# Patient Record
Sex: FEMALE | Race: OTHER | HISPANIC OR LATINO
[De-identification: names, ages, dates, MRNs, and addresses within clinical notes are randomized per-mention and may not be internally consistent; named-entity substitution may affect disease eponyms.]

---

## 2017-03-09 ENCOUNTER — TRANSCRIPTION ENCOUNTER (OUTPATIENT)
Age: 27
End: 2017-03-09

## 2017-05-30 ENCOUNTER — TRANSCRIPTION ENCOUNTER (OUTPATIENT)
Age: 27
End: 2017-05-30

## 2018-11-05 ENCOUNTER — TRANSCRIPTION ENCOUNTER (OUTPATIENT)
Age: 28
End: 2018-11-05

## 2019-07-05 ENCOUNTER — TRANSCRIPTION ENCOUNTER (OUTPATIENT)
Age: 29
End: 2019-07-05

## 2019-07-08 ENCOUNTER — APPOINTMENT (OUTPATIENT)
Dept: ORTHOPEDIC SURGERY | Facility: CLINIC | Age: 29
End: 2019-07-08
Payer: COMMERCIAL

## 2019-07-08 VITALS — BODY MASS INDEX: 40.73 KG/M2 | HEIGHT: 69 IN | WEIGHT: 275 LBS

## 2019-07-08 DIAGNOSIS — M75.52 BURSITIS OF LEFT SHOULDER: ICD-10-CM

## 2019-07-08 PROBLEM — Z00.00 ENCOUNTER FOR PREVENTIVE HEALTH EXAMINATION: Status: ACTIVE | Noted: 2019-07-08

## 2019-07-08 PROCEDURE — 73030 X-RAY EXAM OF SHOULDER: CPT | Mod: LT

## 2019-07-08 PROCEDURE — 99204 OFFICE O/P NEW MOD 45 MIN: CPT

## 2019-07-08 NOTE — DISCUSSION/SUMMARY
[de-identified] : This patient is improving with the Medrol Dosepak. I recommended observation. If in 2 weeks the pain has not gotten better with the Medrol then an MRI of the left shoulder would be done. At this time she may work. Followup will be as needed.

## 2019-07-08 NOTE — PHYSICAL EXAM
[de-identified] : Left shoulder shows a full range of motion some tenderness over the clavicle mild glenohumeral tenderness positive impingement sign no weakness neurovascular exam is normal. [de-identified] : Left shoulder/clavicle x-ray shows no acute fracture or dislocation

## 2019-07-08 NOTE — HISTORY OF PRESENT ILLNESS
[de-identified] : This patient is 2 weeks of left shoulder pain. There is no specific injury. She does lift heavier things at work. She believes it may have happened from that point. No numbness or tingling. She did go to the emergency room and was placed on a Medrol Dosepak and is markedly improved. This is since Friday. No prior problems.

## 2020-07-11 ENCOUNTER — TRANSCRIPTION ENCOUNTER (OUTPATIENT)
Age: 30
End: 2020-07-11

## 2021-10-24 ENCOUNTER — TRANSCRIPTION ENCOUNTER (OUTPATIENT)
Age: 31
End: 2021-10-24

## 2021-11-23 ENCOUNTER — APPOINTMENT (OUTPATIENT)
Dept: SURGERY | Facility: CLINIC | Age: 31
End: 2021-11-23
Payer: MEDICAID

## 2021-11-23 VITALS
BODY MASS INDEX: 43.4 KG/M2 | HEIGHT: 69 IN | WEIGHT: 293 LBS | SYSTOLIC BLOOD PRESSURE: 133 MMHG | OXYGEN SATURATION: 99 % | HEART RATE: 79 BPM | DIASTOLIC BLOOD PRESSURE: 79 MMHG

## 2021-11-23 VITALS — TEMPERATURE: 96.8 F

## 2021-11-23 DIAGNOSIS — K60.2 ANAL FISSURE, UNSPECIFIED: ICD-10-CM

## 2021-11-23 DIAGNOSIS — Z87.19 PERSONAL HISTORY OF OTHER DISEASES OF THE DIGESTIVE SYSTEM: ICD-10-CM

## 2021-11-23 DIAGNOSIS — Z82.3 FAMILY HISTORY OF STROKE: ICD-10-CM

## 2021-11-23 DIAGNOSIS — Z80.0 FAMILY HISTORY OF MALIGNANT NEOPLASM OF DIGESTIVE ORGANS: ICD-10-CM

## 2021-11-23 DIAGNOSIS — Z78.9 OTHER SPECIFIED HEALTH STATUS: ICD-10-CM

## 2021-11-23 DIAGNOSIS — Z82.49 FAMILY HISTORY OF ISCHEMIC HEART DISEASE AND OTHER DISEASES OF THE CIRCULATORY SYSTEM: ICD-10-CM

## 2021-11-23 DIAGNOSIS — Z83.3 FAMILY HISTORY OF DIABETES MELLITUS: ICD-10-CM

## 2021-11-23 PROCEDURE — 99203 OFFICE O/P NEW LOW 30 MIN: CPT

## 2021-11-23 RX ORDER — HYDROCORTISONE 25 MG/G
2.5 CREAM TOPICAL
Qty: 1 | Refills: 1 | Status: ACTIVE | COMMUNITY
Start: 2021-11-23 | End: 1900-01-01

## 2021-11-23 RX ORDER — LIDOCAINE 5 G/100G
5 OINTMENT TOPICAL
Qty: 60 | Refills: 1 | Status: ACTIVE | COMMUNITY
Start: 2021-11-23 | End: 1900-01-01

## 2021-12-02 PROBLEM — Z82.49 FAMILY HISTORY OF HYPERTENSION: Status: ACTIVE | Noted: 2021-12-02

## 2021-12-02 PROBLEM — Z82.49 FAMILY HISTORY OF MYOCARDIAL INFARCTION: Status: ACTIVE | Noted: 2021-12-02

## 2021-12-02 PROBLEM — Z82.49 FAMILY HISTORY OF CORONARY ARTERY DISEASE: Status: ACTIVE | Noted: 2021-12-02

## 2021-12-02 PROBLEM — Z78.9 NO TOBACCO SMOKE EXPOSURE: Status: ACTIVE | Noted: 2021-12-02

## 2021-12-02 PROBLEM — Z80.0 FH: PANCREATIC CANCER: Status: ACTIVE | Noted: 2021-12-02

## 2021-12-02 PROBLEM — Z82.3 FAMILY HISTORY OF CEREBROVASCULAR ACCIDENT (CVA): Status: ACTIVE | Noted: 2021-12-02

## 2021-12-02 PROBLEM — Z83.3 FAMILY HISTORY OF DIABETES MELLITUS: Status: ACTIVE | Noted: 2021-12-02

## 2021-12-02 PROBLEM — Z80.0 FAMILY HISTORY OF MALIGNANT NEOPLASM OF COLON: Status: ACTIVE | Noted: 2021-12-02

## 2021-12-02 PROBLEM — Z87.19 HISTORY OF GASTRITIS: Status: RESOLVED | Noted: 2021-12-02 | Resolved: 2021-12-02

## 2021-12-02 PROBLEM — Z78.9 CONSUMES ALCOHOL OCCASIONALLY: Status: ACTIVE | Noted: 2021-12-02

## 2021-12-02 NOTE — PHYSICAL EXAM
[Anterior] : anteriorly [Excoriation] : no perianal excoriation [JVD] : no jugular venous distention  [Normal Breath Sounds] : Normal breath sounds [Wheezing] : no wheezing was heard [Normal Heart Sounds] : normal heart sounds [Normal Rate and Rhythm] : normal rate and rhythm [Alert] : alert [Oriented to Person] : oriented to person [Oriented to Place] : oriented to place [Oriented to Time] : oriented to time [de-identified] : soft, obese, non-distended, non-tender to palpation [de-identified] : normal external skin and no external hemorrhoids, normal tone and no gross blood, pain on LISET (more so anterior), no anoscopy given pain on LISET [de-identified] : awake, alert, in NAD [de-identified] : normocephalic, atraumatic, EOMI, nl conjunctiva [de-identified] : b/l chest rise, EWOB on RA [de-identified] : deferred [de-identified] : normal strength [de-identified] : perianal skin as above [de-identified] : normal mood and affect

## 2021-12-02 NOTE — HISTORY OF PRESENT ILLNESS
[FreeTextEntry1] : Ms. Senait Roque is a 30y.o. F w/ gastritis presenting for rectal bleeding and pain. She reports that recently she has been having painful BMs and noticing blood w/ every BM. She follows with Dr. BUCKY Ocasio for her chronic gastritis and more recently in August 2021 had a colonoscopy for the bleeding at which time she was noted to just have some polyps. She currently has one BM a day (previously 2-3 times a day) that involve straining and the BMs start hard even though they are soft by the end of the BM. She tried colace for one month and has been on metamucil since July. She has tried prep H without any improvement and hydrocortisone.

## 2022-01-04 ENCOUNTER — APPOINTMENT (OUTPATIENT)
Dept: SURGERY | Facility: CLINIC | Age: 32
End: 2022-01-04

## 2022-02-10 ENCOUNTER — EMERGENCY (EMERGENCY)
Facility: HOSPITAL | Age: 32
LOS: 1 days | Discharge: HOME | End: 2022-02-10
Attending: EMERGENCY MEDICINE
Payer: COMMERCIAL

## 2022-02-10 VITALS
HEART RATE: 121 BPM | TEMPERATURE: 99 F | SYSTOLIC BLOOD PRESSURE: 121 MMHG | DIASTOLIC BLOOD PRESSURE: 79 MMHG | OXYGEN SATURATION: 98 % | RESPIRATION RATE: 18 BRPM

## 2022-02-10 LAB — HCG UR QL: NEGATIVE — SIGNIFICANT CHANGE UP

## 2022-02-10 PROCEDURE — 73030 X-RAY EXAM OF SHOULDER: CPT

## 2022-02-10 PROCEDURE — 99284 EMERGENCY DEPT VISIT MOD MDM: CPT | Mod: 25

## 2022-02-10 PROCEDURE — 73564 X-RAY EXAM KNEE 4 OR MORE: CPT | Mod: 26,LT

## 2022-02-10 PROCEDURE — 73564 X-RAY EXAM KNEE 4 OR MORE: CPT

## 2022-02-10 PROCEDURE — 73502 X-RAY EXAM HIP UNI 2-3 VIEWS: CPT

## 2022-02-10 PROCEDURE — 71046 X-RAY EXAM CHEST 2 VIEWS: CPT | Mod: 26

## 2022-02-10 PROCEDURE — 99053 MED SERV 10PM-8AM 24 HR FAC: CPT

## 2022-02-10 PROCEDURE — 93005 ELECTROCARDIOGRAM TRACING: CPT

## 2022-02-10 PROCEDURE — 73030 X-RAY EXAM OF SHOULDER: CPT | Mod: 26,LT

## 2022-02-10 PROCEDURE — 73502 X-RAY EXAM HIP UNI 2-3 VIEWS: CPT | Mod: 26,LT

## 2022-02-10 PROCEDURE — 71046 X-RAY EXAM CHEST 2 VIEWS: CPT

## 2022-02-10 PROCEDURE — 81025 URINE PREGNANCY TEST: CPT

## 2022-02-10 PROCEDURE — 93010 ELECTROCARDIOGRAM REPORT: CPT

## 2022-02-10 PROCEDURE — 99284 EMERGENCY DEPT VISIT MOD MDM: CPT

## 2022-02-10 RX ORDER — CYCLOBENZAPRINE HYDROCHLORIDE 10 MG/1
1 TABLET, FILM COATED ORAL
Qty: 9 | Refills: 0
Start: 2022-02-10 | End: 2022-02-12

## 2022-02-10 RX ORDER — IBUPROFEN 200 MG
600 TABLET ORAL ONCE
Refills: 0 | Status: COMPLETED | OUTPATIENT
Start: 2022-02-10 | End: 2022-02-10

## 2022-02-10 RX ORDER — OXYCODONE AND ACETAMINOPHEN 5; 325 MG/1; MG/1
1 TABLET ORAL ONCE
Refills: 0 | Status: DISCONTINUED | OUTPATIENT
Start: 2022-02-10 | End: 2022-02-10

## 2022-02-10 RX ORDER — OXYCODONE AND ACETAMINOPHEN 5; 325 MG/1; MG/1
1 TABLET ORAL
Qty: 12 | Refills: 0
Start: 2022-02-10 | End: 2022-02-12

## 2022-02-10 RX ORDER — IBUPROFEN 200 MG
1 TABLET ORAL
Qty: 21 | Refills: 0
Start: 2022-02-10 | End: 2022-02-16

## 2022-02-10 RX ADMIN — Medication 600 MILLIGRAM(S): at 04:00

## 2022-02-10 RX ADMIN — OXYCODONE AND ACETAMINOPHEN 1 TABLET(S): 5; 325 TABLET ORAL at 04:00
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
## 2022-02-10 NOTE — ED PROVIDER NOTE - NSFOLLOWUPINSTRUCTIONS_ED_ALL_ED_FT
[facility]                                                                                                                                                                                                                                                  Contusion      A contusion is a deep bruise. Contusions are the result of a blunt injury to tissues and muscle fibers under the skin. The injury causes bleeding under the skin. The skin overlying the contusion may turn blue, purple, or yellow. Minor injuries will give you a painless contusion, but more severe injuries cause contusions that may stay painful and swollen for a few weeks.      Follow these instructions at home:    Pay attention to any changes in your symptoms. Let your health care provider know about them. Take these actions to relieve your pain.      Managing pain, stiffness, and swelling    •Use resting, icing, applying pressure (compression), and raising (elevating) the injured area. This is often called the RICE strategy.  •Rest the injured area. Return to your normal activities as told by your health care provider. Ask your health care provider what activities are safe for you.    •If directed, put ice on the injured area:  •Put ice in a plastic bag.      •Place a towel between your skin and the bag.      •Leave the ice on for 20 minutes, 2–3 times per day.        •If directed, apply light compression to the injured area using an elastic bandage. Make sure the bandage is not wrapped too tightly. Remove and reapply the bandage as directed by your health care provider.      •If possible, raise (elevate) the injured area above the level of your heart while you are sitting or lying down.        General instructions     •Take over-the-counter and prescription medicines only as told by your health care provider.      •Keep all follow-up visits as told by your health care provider. This is important.        Contact a health care provider if:    •Your symptoms do not improve after several days of treatment.      •Your symptoms get worse.      •You have difficulty moving the injured area.        Get help right away if:    •You have severe pain.      •You have numbness in a hand or foot.      •Your hand or foot turns pale or cold.        Summary    •A contusion is a deep bruise.      •Contusions are the result of a blunt injury to tissues and muscle fibers under the skin.      •It is treated with rest, ice, compression, and elevation. You may be given over-the-counter medicines for pain.      •Contact a health care provider if your symptoms do not improve, or get worse.       •Get help right away if you have severe pain, have numbness, or the area turns pale or cold.      This information is not intended to replace advice given to you by your health care provider. Make sure you discuss any questions you have with your health care provider.      Document Revised: 08/08/2019 Document Reviewed: 08/08/2019    Elsevier Patient Education © 2021 [employer]
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
## 2022-02-10 NOTE — ED PROVIDER NOTE - PROGRESS NOTE DETAILS
xrays negative for fx. pain improving. ace bandage placed on knee. ambulatory with limp favoring the left LE. symptoms likely 2/2 contusion. will dc with pain meds. f/u with PMD. return precautions discussed.

## 2022-02-10 NOTE — ED PROVIDER NOTE - PHYSICAL EXAMINATION
left knee: swelling, ecchymoses, and ttp medial knee. decreased ROM 2/2 pain. remainder of knee WNL.  left shoulder: mild ttp medial aspect. full ROM. no skin changes.  mild ttp left lateral clavicle without swelling or skin changes.  no midline ttp neck or back.  b/l UE and LE N/V intact.

## 2022-02-10 NOTE — ED PROVIDER NOTE - PATIENT PORTAL LINK FT
You can access the FollowMyHealth Patient Portal offered by [facility] by registering at the following website: [email or web address]. By joining [employer]’s FollowMyHealth portal, you will also be able to view your health information using other applications (apps) compatible with our system.

## 2022-12-06 ENCOUNTER — NON-APPOINTMENT (OUTPATIENT)
Age: 32
End: 2022-12-06

## 2023-09-03 ENCOUNTER — EMERGENCY (EMERGENCY)
Facility: HOSPITAL | Age: 33
LOS: 1 days | Discharge: HOME | End: 2023-09-03
Attending: STUDENT IN AN ORGANIZED HEALTH CARE EDUCATION/TRAINING PROGRAM
Payer: SELF-PAY

## 2023-09-03 VITALS
OXYGEN SATURATION: 98 % | SYSTOLIC BLOOD PRESSURE: 118 MMHG | DIASTOLIC BLOOD PRESSURE: 77 MMHG | TEMPERATURE: 98 F | RESPIRATION RATE: 17 BRPM | HEART RATE: 96 BPM

## 2023-09-03 VITALS
HEIGHT: 69 IN | SYSTOLIC BLOOD PRESSURE: 141 MMHG | HEART RATE: 105 BPM | TEMPERATURE: 98 F | OXYGEN SATURATION: 98 % | RESPIRATION RATE: 18 BRPM | DIASTOLIC BLOOD PRESSURE: 85 MMHG | WEIGHT: 293 LBS

## 2023-09-03 LAB
ALBUMIN SERPL ELPH-MCNC: 3.8 G/DL — SIGNIFICANT CHANGE UP (ref 3.5–5)
ALP SERPL-CCNC: 107 U/L — SIGNIFICANT CHANGE UP (ref 40–120)
ALT FLD-CCNC: 30 U/L DA — SIGNIFICANT CHANGE UP (ref 10–60)
ANION GAP SERPL CALC-SCNC: 5 MMOL/L — SIGNIFICANT CHANGE UP (ref 5–17)
APTT BLD: 24.4 SEC — LOW (ref 24.5–35.6)
AST SERPL-CCNC: 16 U/L — SIGNIFICANT CHANGE UP (ref 10–40)
BASOPHILS # BLD AUTO: 0.04 K/UL — SIGNIFICANT CHANGE UP (ref 0–0.2)
BASOPHILS NFR BLD AUTO: 0.6 % — SIGNIFICANT CHANGE UP (ref 0–2)
BILIRUB SERPL-MCNC: 0.5 MG/DL — SIGNIFICANT CHANGE UP (ref 0.2–1.2)
BUN SERPL-MCNC: 11 MG/DL — SIGNIFICANT CHANGE UP (ref 7–18)
CALCIUM SERPL-MCNC: 9 MG/DL — SIGNIFICANT CHANGE UP (ref 8.4–10.5)
CHLORIDE SERPL-SCNC: 106 MMOL/L — SIGNIFICANT CHANGE UP (ref 96–108)
CO2 SERPL-SCNC: 26 MMOL/L — SIGNIFICANT CHANGE UP (ref 22–31)
CREAT SERPL-MCNC: 0.85 MG/DL — SIGNIFICANT CHANGE UP (ref 0.5–1.3)
D DIMER BLD IA.RAPID-MCNC: <150 NG/ML DDU — SIGNIFICANT CHANGE UP
EGFR: 94 ML/MIN/1.73M2 — SIGNIFICANT CHANGE UP
EOSINOPHIL # BLD AUTO: 0.11 K/UL — SIGNIFICANT CHANGE UP (ref 0–0.5)
EOSINOPHIL NFR BLD AUTO: 1.5 % — SIGNIFICANT CHANGE UP (ref 0–6)
GLUCOSE SERPL-MCNC: 126 MG/DL — HIGH (ref 70–99)
HCG SERPL-ACNC: <1 MIU/ML — SIGNIFICANT CHANGE UP
HCT VFR BLD CALC: 39.6 % — SIGNIFICANT CHANGE UP (ref 34.5–45)
HGB BLD-MCNC: 12.7 G/DL — SIGNIFICANT CHANGE UP (ref 11.5–15.5)
IMM GRANULOCYTES NFR BLD AUTO: 0.6 % — SIGNIFICANT CHANGE UP (ref 0–0.9)
INR BLD: 0.99 RATIO — SIGNIFICANT CHANGE UP (ref 0.85–1.18)
LYMPHOCYTES # BLD AUTO: 1.84 K/UL — SIGNIFICANT CHANGE UP (ref 1–3.3)
LYMPHOCYTES # BLD AUTO: 25.7 % — SIGNIFICANT CHANGE UP (ref 13–44)
MCHC RBC-ENTMCNC: 27.4 PG — SIGNIFICANT CHANGE UP (ref 27–34)
MCHC RBC-ENTMCNC: 32.1 GM/DL — SIGNIFICANT CHANGE UP (ref 32–36)
MCV RBC AUTO: 85.5 FL — SIGNIFICANT CHANGE UP (ref 80–100)
MONOCYTES # BLD AUTO: 0.55 K/UL — SIGNIFICANT CHANGE UP (ref 0–0.9)
MONOCYTES NFR BLD AUTO: 7.7 % — SIGNIFICANT CHANGE UP (ref 2–14)
NEUTROPHILS # BLD AUTO: 4.58 K/UL — SIGNIFICANT CHANGE UP (ref 1.8–7.4)
NEUTROPHILS NFR BLD AUTO: 63.9 % — SIGNIFICANT CHANGE UP (ref 43–77)
NRBC # BLD: 0 /100 WBCS — SIGNIFICANT CHANGE UP (ref 0–0)
PLATELET # BLD AUTO: 303 K/UL — SIGNIFICANT CHANGE UP (ref 150–400)
POTASSIUM SERPL-MCNC: 4.1 MMOL/L — SIGNIFICANT CHANGE UP (ref 3.5–5.3)
POTASSIUM SERPL-SCNC: 4.1 MMOL/L — SIGNIFICANT CHANGE UP (ref 3.5–5.3)
PROT SERPL-MCNC: 8.2 G/DL — SIGNIFICANT CHANGE UP (ref 6–8.3)
PROTHROM AB SERPL-ACNC: 11.3 SEC — SIGNIFICANT CHANGE UP (ref 9.5–13)
RBC # BLD: 4.63 M/UL — SIGNIFICANT CHANGE UP (ref 3.8–5.2)
RBC # FLD: 14.3 % — SIGNIFICANT CHANGE UP (ref 10.3–14.5)
SODIUM SERPL-SCNC: 137 MMOL/L — SIGNIFICANT CHANGE UP (ref 135–145)
TROPONIN I, HIGH SENSITIVITY RESULT: <3 NG/L — SIGNIFICANT CHANGE UP
TSH SERPL-MCNC: 5.1 UU/ML — HIGH (ref 0.34–4.82)
WBC # BLD: 7.16 K/UL — SIGNIFICANT CHANGE UP (ref 3.8–10.5)
WBC # FLD AUTO: 7.16 K/UL — SIGNIFICANT CHANGE UP (ref 3.8–10.5)

## 2023-09-03 PROCEDURE — 71275 CT ANGIOGRAPHY CHEST: CPT | Mod: MA

## 2023-09-03 PROCEDURE — 84480 ASSAY TRIIODOTHYRONINE (T3): CPT

## 2023-09-03 PROCEDURE — 71046 X-RAY EXAM CHEST 2 VIEWS: CPT | Mod: 26

## 2023-09-03 PROCEDURE — 99053 MED SERV 10PM-8AM 24 HR FAC: CPT

## 2023-09-03 PROCEDURE — 80053 COMPREHEN METABOLIC PANEL: CPT

## 2023-09-03 PROCEDURE — 70491 CT SOFT TISSUE NECK W/DYE: CPT | Mod: 26,MA

## 2023-09-03 PROCEDURE — 84443 ASSAY THYROID STIM HORMONE: CPT

## 2023-09-03 PROCEDURE — 71275 CT ANGIOGRAPHY CHEST: CPT | Mod: 26,MA

## 2023-09-03 PROCEDURE — 84439 ASSAY OF FREE THYROXINE: CPT

## 2023-09-03 PROCEDURE — 85379 FIBRIN DEGRADATION QUANT: CPT

## 2023-09-03 PROCEDURE — 96374 THER/PROPH/DIAG INJ IV PUSH: CPT | Mod: XU

## 2023-09-03 PROCEDURE — 85610 PROTHROMBIN TIME: CPT

## 2023-09-03 PROCEDURE — 84484 ASSAY OF TROPONIN QUANT: CPT

## 2023-09-03 PROCEDURE — 93010 ELECTROCARDIOGRAM REPORT: CPT

## 2023-09-03 PROCEDURE — 70491 CT SOFT TISSUE NECK W/DYE: CPT | Mod: MA

## 2023-09-03 PROCEDURE — 36415 COLL VENOUS BLD VENIPUNCTURE: CPT

## 2023-09-03 PROCEDURE — 85730 THROMBOPLASTIN TIME PARTIAL: CPT

## 2023-09-03 PROCEDURE — 93005 ELECTROCARDIOGRAM TRACING: CPT

## 2023-09-03 PROCEDURE — 84702 CHORIONIC GONADOTROPIN TEST: CPT

## 2023-09-03 PROCEDURE — 85025 COMPLETE CBC W/AUTO DIFF WBC: CPT

## 2023-09-03 PROCEDURE — 99285 EMERGENCY DEPT VISIT HI MDM: CPT | Mod: 25

## 2023-09-03 PROCEDURE — 71046 X-RAY EXAM CHEST 2 VIEWS: CPT

## 2023-09-03 PROCEDURE — 99285 EMERGENCY DEPT VISIT HI MDM: CPT

## 2023-09-03 RX ORDER — KETOROLAC TROMETHAMINE 30 MG/ML
15 SYRINGE (ML) INJECTION ONCE
Refills: 0 | Status: DISCONTINUED | OUTPATIENT
Start: 2023-09-03 | End: 2023-09-03

## 2023-09-03 RX ADMIN — Medication 15 MILLIGRAM(S): at 10:20

## 2023-09-03 RX ADMIN — Medication 15 MILLIGRAM(S): at 09:44

## 2023-09-03 NOTE — ED PROVIDER NOTE - CLINICAL SUMMARY MEDICAL DECISION MAKING FREE TEXT BOX
32-year-old female with no past medical history coming in with right-sided neck pain for past few days.  Having subjective fevers.  Feels short of breath with exertion.  No abdominal pain, nausea, vomiting, diarrhea, urinary complaints.  Denies chest pain.  No history of similar symptoms in the past, denies using birth control pills.  Denies recent surgeries.  No history of DVT or PE in the past.  No calf swelling or pain.  Patient is well-appearing.  No distress.  Clear to auscultation bilaterally.  Regular rate and rhythm.  Positive pain and swelling to the right side of the neck -anterior lateral aspect without overlying erythema or increased warmth.  No tonsillar swelling or exudates noted.   differential diagnoses include but not limited to abscess, viral illness, thyroid nodule.  Patient PERC negative–D-dimer is negative.

## 2023-09-03 NOTE — ED ADULT NURSE REASSESSMENT NOTE - NS ED NURSE REASSESS COMMENT FT1
Pt resting in bed, A&Ox3, awaiting CT scan, no acute distress noted, denies chest pain, no SOB noted.

## 2023-09-03 NOTE — ED ADULT NURSE NOTE - NSFALLUNIVINTERV_ED_ALL_ED
Bed/Stretcher in lowest position, wheels locked, appropriate side rails in place/Call bell, personal items and telephone in reach/Instruct patient to call for assistance before getting out of bed/chair/stretcher/Non-slip footwear applied when patient is off stretcher/[place] to call system/Physically safe environment - no spills, clutter or unnecessary equipment/Purposeful proactive rounding/Room/bathroom lighting operational, light cord in reach

## 2023-09-03 NOTE — ED PROVIDER NOTE - PROGRESS NOTE DETAILS
Rosita Rodriguez, DO:  Feels better. No new complaints. Results are discussed. An opportunity to ask questions was provided and all questions answered. Discharge plan is discussed with the patient. Patient reports understanding to do the follow up with pcp. Return precautions discussed.

## 2023-09-03 NOTE — ED PROVIDER NOTE - PATIENT PORTAL LINK FT
You can access the FollowMyHealth Patient Portal offered by [facility] by registering at the following website: [email or web address]. By joining [employer]s FollowMyHealth portal, you will also be able to view your health information using other applications (apps) compatible with our system.

## 2023-09-03 NOTE — ED PROVIDER NOTE - NSFOLLOWUPINSTRUCTIONS_ED_ALL_ED_FT
You were seen for neck pain that is likely due to lymph nodes that are sometimes swollen due to viral illness.     No signs of emergency medical condition on today's workup.  Your results are attached with your discharge instructions, please review them with your primary care physician. If there is a result pending, you will receive a call if test is positive.    A presumptive diagnosis is made today, but further evaluation may be required by your primary care doctor and/or specialist for a definitive diagnosis. Therefore, follow up as directed and if symptoms change/worsen or any emergency conditions, please return to the ER.    For pain or fever you can ibuprofen (motrin, advil) or tylenol as needed, as directed on packaging.  You can use 500-1000mg Tylenol every 6 hours for pain - as needed.  This is an over-the-counter medications - please respect the warnings on the label. This medication come with certain risks and side effects that you need to discuss with your doctor, especially if you are taking it for a prolonged period.    You can use 400-600mg Ibuprofen (such as motrin or advil) every 6 to 8 hours as needed for pain control.  Take ibuprofen with food or milk to lessen stomach upset.  This is an over-the-counter medication please respect the warnings on the label. All medications come with certain risks and side effects that you need to discuss with your doctor, especially if you are taking them for a prolonged period.    If needed, call patient access services at [phone] to find a primary care doctor, or call at [phone] to make an appointment at the clinic.

## 2023-09-03 NOTE — ED ADULT NURSE NOTE - NS ED NURSE DISCH DISPOSITION
[Time Spent: ___ minutes] : I have spent [unfilled] minutes of time on the encounter. [>50% of the face to face encounter time was spent on counseling and/or coordination of care for ___] : Greater than 50% of the face to face encounter time was spent on counseling and/or coordination of care for [unfilled] Discharged

## 2023-09-03 NOTE — ED ADULT NURSE NOTE - OBJECTIVE STATEMENT
As per patient c/o chest pain x2 days. Pt reports burning sensation on her arms. Patient reports SOB and dyspnea on exertion. Pt states she feels pressure on her chest. Pt also reports right sided facial pain that radiates down her right side of her body Patient denies any chest pain nausea, or vomiting. No apparent distress noted. Pt denies all other signs and symptoms

## 2023-09-09 LAB
T3 SERPL-MCNC: 131 NG/DL — SIGNIFICANT CHANGE UP
T3FREE SERPL-MCNC: 3.5 PG/ML — SIGNIFICANT CHANGE UP
T4 FREE SERPL-MCNC: 1.29 NG/DL — SIGNIFICANT CHANGE UP

## 2023-09-25 NOTE — ED ADULT NURSE NOTE - NSFALLRSKUNASSIST_ED_ALL_ED
[facility]    Brief Operative Note    Pre-operative diagnosis: Acute cholecystitis [K81.0]  Post-operative diagnosis Same as pre-operative diagnosis    Procedure: Procedure(s):  CHOLECYSTECTOMY, LAPAROSCOPIC  Surgeon: Surgeon(s) and Role:     * Mu Katz MD - Primary     * Tyrell Maldonado PA-C - Assisting  Anesthesia: General   Estimated Blood Loss: 8 mL from 9/25/2023  9:26 AM to 9/25/2023 10:52 AM      Drains: None  Specimens:   ID Type Source Tests Collected by Time Destination   1 :  Tissue Gallbladder SURGICAL PATHOLOGY EXAM Mu Katz MD 9/25/2023 10:29 AM      Findings:   Mildly inflamed gallbladder  Complications: None.  Implants: * No implants in log *    Inga Clark PA-STUDENT    Tyrell Maldonado PA-C  Office: [phone]  Pager: [phone]         no

## 2024-01-14 ENCOUNTER — INPATIENT (INPATIENT)
Facility: HOSPITAL | Age: 34
LOS: 2 days | Discharge: HOME | DRG: 552 | End: 2024-01-17
Attending: INTERNAL MEDICINE | Admitting: INTERNAL MEDICINE
Payer: COMMERCIAL

## 2024-01-14 VITALS
DIASTOLIC BLOOD PRESSURE: 78 MMHG | RESPIRATION RATE: 16 BRPM | TEMPERATURE: 98 F | HEIGHT: 69 IN | HEART RATE: 93 BPM | OXYGEN SATURATION: 100 % | WEIGHT: 293 LBS | SYSTOLIC BLOOD PRESSURE: 127 MMHG

## 2024-01-14 DIAGNOSIS — Z29.9 ENCOUNTER FOR PROPHYLACTIC MEASURES, UNSPECIFIED: ICD-10-CM

## 2024-01-14 DIAGNOSIS — M54.50 LOW BACK PAIN, UNSPECIFIED: ICD-10-CM

## 2024-01-14 DIAGNOSIS — M54.9 DORSALGIA, UNSPECIFIED: ICD-10-CM

## 2024-01-14 LAB
ANION GAP SERPL CALC-SCNC: 4 MMOL/L — LOW (ref 5–17)
ANION GAP SERPL CALC-SCNC: 4 MMOL/L — LOW (ref 5–17)
BUN SERPL-MCNC: 13 MG/DL — SIGNIFICANT CHANGE UP (ref 7–18)
BUN SERPL-MCNC: 13 MG/DL — SIGNIFICANT CHANGE UP (ref 7–18)
CALCIUM SERPL-MCNC: 9 MG/DL — SIGNIFICANT CHANGE UP (ref 8.4–10.5)
CALCIUM SERPL-MCNC: 9 MG/DL — SIGNIFICANT CHANGE UP (ref 8.4–10.5)
CHLORIDE SERPL-SCNC: 107 MMOL/L — SIGNIFICANT CHANGE UP (ref 96–108)
CHLORIDE SERPL-SCNC: 107 MMOL/L — SIGNIFICANT CHANGE UP (ref 96–108)
CO2 SERPL-SCNC: 26 MMOL/L — SIGNIFICANT CHANGE UP (ref 22–31)
CO2 SERPL-SCNC: 26 MMOL/L — SIGNIFICANT CHANGE UP (ref 22–31)
CREAT SERPL-MCNC: 0.65 MG/DL — SIGNIFICANT CHANGE UP (ref 0.5–1.3)
CREAT SERPL-MCNC: 0.65 MG/DL — SIGNIFICANT CHANGE UP (ref 0.5–1.3)
EGFR: 121 ML/MIN/1.73M2 — SIGNIFICANT CHANGE UP
EGFR: 121 ML/MIN/1.73M2 — SIGNIFICANT CHANGE UP
GLUCOSE SERPL-MCNC: 88 MG/DL — SIGNIFICANT CHANGE UP (ref 70–99)
GLUCOSE SERPL-MCNC: 88 MG/DL — SIGNIFICANT CHANGE UP (ref 70–99)
HCT VFR BLD CALC: 39.1 % — SIGNIFICANT CHANGE UP (ref 34.5–45)
HCT VFR BLD CALC: 39.1 % — SIGNIFICANT CHANGE UP (ref 34.5–45)
HGB BLD-MCNC: 12.5 G/DL — SIGNIFICANT CHANGE UP (ref 11.5–15.5)
HGB BLD-MCNC: 12.5 G/DL — SIGNIFICANT CHANGE UP (ref 11.5–15.5)
MCHC RBC-ENTMCNC: 27.2 PG — SIGNIFICANT CHANGE UP (ref 27–34)
MCHC RBC-ENTMCNC: 27.2 PG — SIGNIFICANT CHANGE UP (ref 27–34)
MCHC RBC-ENTMCNC: 32 GM/DL — SIGNIFICANT CHANGE UP (ref 32–36)
MCHC RBC-ENTMCNC: 32 GM/DL — SIGNIFICANT CHANGE UP (ref 32–36)
MCV RBC AUTO: 85 FL — SIGNIFICANT CHANGE UP (ref 80–100)
MCV RBC AUTO: 85 FL — SIGNIFICANT CHANGE UP (ref 80–100)
NRBC # BLD: 0 /100 WBCS — SIGNIFICANT CHANGE UP (ref 0–0)
NRBC # BLD: 0 /100 WBCS — SIGNIFICANT CHANGE UP (ref 0–0)
PLATELET # BLD AUTO: 304 K/UL — SIGNIFICANT CHANGE UP (ref 150–400)
PLATELET # BLD AUTO: 304 K/UL — SIGNIFICANT CHANGE UP (ref 150–400)
POTASSIUM SERPL-MCNC: 4.1 MMOL/L — SIGNIFICANT CHANGE UP (ref 3.5–5.3)
POTASSIUM SERPL-MCNC: 4.1 MMOL/L — SIGNIFICANT CHANGE UP (ref 3.5–5.3)
POTASSIUM SERPL-SCNC: 4.1 MMOL/L — SIGNIFICANT CHANGE UP (ref 3.5–5.3)
POTASSIUM SERPL-SCNC: 4.1 MMOL/L — SIGNIFICANT CHANGE UP (ref 3.5–5.3)
RBC # BLD: 4.6 M/UL — SIGNIFICANT CHANGE UP (ref 3.8–5.2)
RBC # BLD: 4.6 M/UL — SIGNIFICANT CHANGE UP (ref 3.8–5.2)
RBC # FLD: 13.9 % — SIGNIFICANT CHANGE UP (ref 10.3–14.5)
RBC # FLD: 13.9 % — SIGNIFICANT CHANGE UP (ref 10.3–14.5)
SODIUM SERPL-SCNC: 137 MMOL/L — SIGNIFICANT CHANGE UP (ref 135–145)
SODIUM SERPL-SCNC: 137 MMOL/L — SIGNIFICANT CHANGE UP (ref 135–145)
WBC # BLD: 9.05 K/UL — SIGNIFICANT CHANGE UP (ref 3.8–10.5)
WBC # BLD: 9.05 K/UL — SIGNIFICANT CHANGE UP (ref 3.8–10.5)
WBC # FLD AUTO: 9.05 K/UL — SIGNIFICANT CHANGE UP (ref 3.8–10.5)
WBC # FLD AUTO: 9.05 K/UL — SIGNIFICANT CHANGE UP (ref 3.8–10.5)

## 2024-01-14 PROCEDURE — 72110 X-RAY EXAM L-2 SPINE 4/>VWS: CPT | Mod: 26

## 2024-01-14 PROCEDURE — 99285 EMERGENCY DEPT VISIT HI MDM: CPT

## 2024-01-14 RX ORDER — SODIUM CHLORIDE 9 MG/ML
1000 INJECTION INTRAMUSCULAR; INTRAVENOUS; SUBCUTANEOUS ONCE
Refills: 0 | Status: COMPLETED | OUTPATIENT
Start: 2024-01-14 | End: 2024-01-14

## 2024-01-14 RX ORDER — GABAPENTIN 400 MG/1
1 CAPSULE ORAL
Qty: 15 | Refills: 0
Start: 2024-01-14 | End: 2024-01-18

## 2024-01-14 RX ORDER — IBUPROFEN 200 MG
1 TABLET ORAL
Qty: 42 | Refills: 0
Start: 2024-01-14 | End: 2024-01-27

## 2024-01-14 RX ORDER — ACETAMINOPHEN 500 MG
650 TABLET ORAL EVERY 6 HOURS
Refills: 0 | Status: DISCONTINUED | OUTPATIENT
Start: 2024-01-14 | End: 2024-01-15

## 2024-01-14 RX ORDER — GABAPENTIN 400 MG/1
300 CAPSULE ORAL ONCE
Refills: 0 | Status: COMPLETED | OUTPATIENT
Start: 2024-01-14 | End: 2024-01-14

## 2024-01-14 RX ORDER — IBUPROFEN 200 MG
600 TABLET ORAL ONCE
Refills: 0 | Status: COMPLETED | OUTPATIENT
Start: 2024-01-14 | End: 2024-01-14

## 2024-01-14 RX ORDER — ONDANSETRON 8 MG/1
8 TABLET, FILM COATED ORAL ONCE
Refills: 0 | Status: COMPLETED | OUTPATIENT
Start: 2024-01-14 | End: 2024-01-14

## 2024-01-14 RX ORDER — HYDROMORPHONE HYDROCHLORIDE 2 MG/ML
0.5 INJECTION INTRAMUSCULAR; INTRAVENOUS; SUBCUTANEOUS ONCE
Refills: 0 | Status: DISCONTINUED | OUTPATIENT
Start: 2024-01-14 | End: 2024-01-14

## 2024-01-14 RX ORDER — OXYCODONE AND ACETAMINOPHEN 5; 325 MG/1; MG/1
1 TABLET ORAL ONCE
Refills: 0 | Status: DISCONTINUED | OUTPATIENT
Start: 2024-01-14 | End: 2024-01-14

## 2024-01-14 RX ORDER — CYCLOBENZAPRINE HYDROCHLORIDE 10 MG/1
5 TABLET, FILM COATED ORAL THREE TIMES A DAY
Refills: 0 | Status: DISCONTINUED | OUTPATIENT
Start: 2024-01-14 | End: 2024-01-15

## 2024-01-14 RX ADMIN — GABAPENTIN 300 MILLIGRAM(S): 400 CAPSULE ORAL at 15:19

## 2024-01-14 RX ADMIN — SODIUM CHLORIDE 1000 MILLILITER(S): 9 INJECTION INTRAMUSCULAR; INTRAVENOUS; SUBCUTANEOUS at 15:19

## 2024-01-14 RX ADMIN — Medication 600 MILLIGRAM(S): at 15:26

## 2024-01-14 RX ADMIN — SODIUM CHLORIDE 1000 MILLILITER(S): 9 INJECTION INTRAMUSCULAR; INTRAVENOUS; SUBCUTANEOUS at 16:33

## 2024-01-14 RX ADMIN — HYDROMORPHONE HYDROCHLORIDE 0.5 MILLIGRAM(S): 2 INJECTION INTRAMUSCULAR; INTRAVENOUS; SUBCUTANEOUS at 20:05

## 2024-01-14 RX ADMIN — OXYCODONE AND ACETAMINOPHEN 1 TABLET(S): 5; 325 TABLET ORAL at 11:02

## 2024-01-14 RX ADMIN — ONDANSETRON 108 MILLIGRAM(S): 8 TABLET, FILM COATED ORAL at 16:33

## 2024-01-14 RX ADMIN — Medication 600 MILLIGRAM(S): at 11:02

## 2024-01-14 RX ADMIN — HYDROMORPHONE HYDROCHLORIDE 0.5 MILLIGRAM(S): 2 INJECTION INTRAMUSCULAR; INTRAVENOUS; SUBCUTANEOUS at 15:26

## 2024-01-14 RX ADMIN — ONDANSETRON 8 MILLIGRAM(S): 8 TABLET, FILM COATED ORAL at 20:05

## 2024-01-14 RX ADMIN — OXYCODONE AND ACETAMINOPHEN 1 TABLET(S): 5; 325 TABLET ORAL at 15:26

## 2024-01-14 RX ADMIN — SODIUM CHLORIDE 1000 MILLILITER(S): 9 INJECTION INTRAMUSCULAR; INTRAVENOUS; SUBCUTANEOUS at 20:05

## 2024-01-14 NOTE — ED ADULT NURSE NOTE - TEMPLATE
Back Pain patient remained hemodynamically stable, results of the labs, imaging findings reviewed and discussed with patient, discussed with admitting physician and MAR, patient is admitted to Medicine for further evaluation and care.

## 2024-01-14 NOTE — H&P ADULT - PROBLEM SELECTOR PLAN 1
Pw lower back pain, radiating to lower ext b/l  PE - pain upon hip flexion, no pain on straight leg raise, no signs of compression    started on toradol 30mg prn   started on flexiril   Neurology consulted   f/u lumbarsacral x rays

## 2024-01-14 NOTE — H&P ADULT - HISTORY OF PRESENT ILLNESS
33 y.o. F PMHx of morbid obesity presents to the ED today with lower back pain.  Patient reports yesterday she suddenly had sharp pain and stiffness in her lower back rating down both legs.  Patient states that she has no comfortable position when she sitting or standing she has pain.  Denies any numbness, tingling, sensory losses, incontinence or inability to move lower extremities. States she did not take anything for the pain. Otherwise pt denies any chest pain, palpitations, shortness of breath, fever, chills, headache, visual changes, nausea, vomiting diarrhea. NKDA

## 2024-01-14 NOTE — H&P ADULT - NSHPREVIEWOFSYSTEMS_GEN_ALL_CORE
REVIEW OF SYSTEMS:    CONSTITUTIONAL: No weakness, fevers or chills  EYES/ENT: No visual changes;  No vertigo or throat pain   NECK: No pain or stiffness  RESPIRATORY: No cough, wheezing, hemoptysis; No shortness of breath  CARDIOVASCULAR: No chest pain or palpitations  GASTROINTESTINAL: No abdominal or epigastric pain. No nausea, vomiting, or hematemesis; No diarrhea or constipation. No melena or hematochezia.  GENITOURINARY: No dysuria, frequency or hematuria  NEUROLOGICAL: No numbness or weakness  SKIN: No itching, burning, rashes, or lesions   MSK: + lower back pain   All other review of systems is negative unless indicated above.

## 2024-01-14 NOTE — H&P ADULT - ASSESSMENT
33 y.o. F PMHx of morbid obesity presents to the ED today with lower back pain. Admitted to medicine for pain management, pending neurology consult.

## 2024-01-14 NOTE — ED PROVIDER NOTE - PROGRESS NOTE DETAILS
Patient has no red flags for back pain.  States she has severe pain and would like to obtain imaging.  Explained to patient the benefits and risks of imaging and atraumatic back pain patient insistent that she would like imaging of her spine and CT lumbar pending. Pt received as a signout.  Pending CT.  Patient is evaluated.  Patient does not have any spinal tenderness to palpation however also never had any imaging done to the back.  Shared decision making to hold off on the CT and perform an x-ray instead.  X-rays reviewed and no fractures noted.  Patient continues to have pain–improved but still having difficulty getting out of bed.  Patient is offered muscle relaxant.  Patient reports she has been very nauseous from all the medications she has gotten and would like to hold off at this time.  Equal strength and sensation in lower extremities no urinary incontinence, stool incontinence, urinary retention, fevers.  Case is discussed with Dr. Starks and MAR agrees with admission.

## 2024-01-14 NOTE — ED PROVIDER NOTE - OBJECTIVE STATEMENT
33-year-old female past medical history of morbid obesity presents for lower back pain.  Patient reports yesterday she suddenly had sharp pain and stiffness in her lower back rating down both legs.  Patient states that she has no comfortable position when she sitting or standing she has pain.  Denies incontinence or inability to move lower extremities.    GENERAL APPEARANCE:  AxOx4, generally well-appearing, morbidly obese, no acute distress.  HEENT:  NC, AT. MMM. EOMI, clear conjunctiva, oropharynx clear.  NECK:  Supple without lymphadenopathy.  No stiffness or restricted ROM.  HEART:  Normal rate and regular rhythm, normal S1/S1, no m/r/g  LUNGS:  CTAB, moving air well. No crackles or wheezes are heard.  ABDOMEN:  Soft, nontender, nondistended with good bowel sounds heard.  BACK: No CVAT, no obvious deformity.  EXTREMITIES:  Without cyanosis, clubbing or edema. (-) straight leg raise.  NEUROLOGICAL:  Grossly nonfocal. Alert and oriented, moving all 4 extremities. CN II-XII grossly intact. Observed to ambulate with normal gait.  Skin:  Warm and dry without any rash.

## 2024-01-14 NOTE — H&P ADULT - ATTENDING COMMENTS
Pt appearing with debilitating LBP, clinically indicative of acute radiculopathy incapacitating pt in daily tasks /ambulation. Pain management initiated a regimen. May benefit from starting flexeril. Neurology to follow in a.m. May consider outpt epidural should this not improve in time.

## 2024-01-14 NOTE — H&P ADULT - NSHPPHYSICALEXAM_GEN_ALL_CORE
VITALS:     GENERAL: NAD, lying in bed comfortably  HEAD:  Atraumatic, Normocephalic  EYES: EOMI, PERRLA, conjunctiva and sclera clear  ENT: Moist mucous membranes  NECK: Supple, No JVD  CHEST/LUNG: Clear to auscultation bilaterally; No rales, rhonchi, wheezing, or rubs. Unlabored respirations  HEART: Regular rate and rhythm; No murmurs, rubs, or gallops  ABDOMEN: Bowel sounds present; Soft, Nontender, Nondistended. No hepatomegaly  EXTREMITIES:  2+ Peripheral Pulses, brisk capillary refill. No clubbing, cyanosis, or edema  NERVOUS SYSTEM:  Alert & Oriented X3, speech clear. No deficits   MSK: NEG straight leg raise test, FROM all 4 extremities, full and equal strength  SKIN: No rashes or lesions

## 2024-01-14 NOTE — ED ADULT TRIAGE NOTE - CHIEF COMPLAINT QUOTE
lower back pain radiating to both legs and both hands tingling since yesterday after moving heavy stuffs.

## 2024-01-14 NOTE — ED PROVIDER NOTE - CLINICAL SUMMARY MEDICAL DECISION MAKING FREE TEXT BOX
This patient presents with back pain most consistent with _. Differential diagnoses includes lumbago versus musculoskeletal spasm / strain versus sciatica. Less likely sciatica as straight leg raise test was negative. No back pain red flags on history or physical. Presentation not consistent with malignancy (lack of history of malignancy, lack of B symptoms), fracture (no trauma, no bony tenderness to palpation), cauda equina (no bowel or urinary incontinence/retention, no saddle anesthesia, no distal weakness), AAA, viscus perforation, osteomyelitis or epidural abscess (no IVDU, vertebral tenderness), renal colic, pyelonephritis (afebrile, no CVAT, no urinary symptoms). Given the clinical picture, no indication for imaging at this time.

## 2024-01-14 NOTE — H&P ADULT - PROBLEM SELECTOR PLAN 2
IMPROVE VTE Individual Risk Assessment          RISK                                                          Points  [  ] Previous VTE                                                3  [  ] Thrombophilia                                             2  [  ] Lower limb paralysis                                   2        (unable to hold up >15 seconds)    [  ] Current Cancer                                             2         (within 6 months)  [ x ] Immobilization > 24 hrs                              1  [  ] ICU/CCU stay > 24 hours                             1  [  ] Age > 60                                                         1    IMPROVE VTE Score:         [   1     ]

## 2024-01-14 NOTE — ED ADULT NURSE NOTE - NSFALLUNIVINTERV_ED_ALL_ED
Bed/Stretcher in lowest position, wheels locked, appropriate side rails in place/Call bell, personal items and telephone in reach/Instruct patient to call for assistance before getting out of bed/chair/stretcher/Non-slip footwear applied when patient is off stretcher/[place] to call system/Physically safe environment - no spills, clutter or unnecessary equipment/Purposeful proactive rounding/Room/bathroom lighting operational, light cord in reach Bed/Stretcher in lowest position, wheels locked, appropriate side rails in place/Call bell, personal items and telephone in reach/Instruct patient to call for assistance before getting out of bed/chair/stretcher/Non-slip footwear applied when patient is off stretcher/[place] to call system/Physically safe environment - no spills, clutter or unnecessary equipment/Purposeful proactive rounding/Room/bathroom lighting operational, light cord in reach Bed/Stretcher in lowest position, wheels locked, appropriate side rails in place/Call bell, personal items and telephone in reach/Instruct patient to call for assistance before getting out of bed/chair/stretcher/Non-slip footwear applied when patient is off stretcher/[place] to call system/Physically safe environment - no spills, clutter or unnecessary equipment/Purposeful proactive rounding/Room/bathroom lighting operational, light cord in reach

## 2024-01-15 DIAGNOSIS — M51.36 OTHER INTERVERTEBRAL DISC DEGENERATION, LUMBAR REGION: ICD-10-CM

## 2024-01-15 DIAGNOSIS — M54.9 DORSALGIA, UNSPECIFIED: ICD-10-CM

## 2024-01-15 DIAGNOSIS — E66.01 MORBID (SEVERE) OBESITY DUE TO EXCESS CALORIES: ICD-10-CM

## 2024-01-15 DIAGNOSIS — K29.70 GASTRITIS, UNSPECIFIED, WITHOUT BLEEDING: ICD-10-CM

## 2024-01-15 LAB
ANION GAP SERPL CALC-SCNC: 3 MMOL/L — LOW (ref 5–17)
ANION GAP SERPL CALC-SCNC: 3 MMOL/L — LOW (ref 5–17)
BUN SERPL-MCNC: 15 MG/DL — SIGNIFICANT CHANGE UP (ref 7–18)
BUN SERPL-MCNC: 15 MG/DL — SIGNIFICANT CHANGE UP (ref 7–18)
CALCIUM SERPL-MCNC: 9.2 MG/DL — SIGNIFICANT CHANGE UP (ref 8.4–10.5)
CALCIUM SERPL-MCNC: 9.2 MG/DL — SIGNIFICANT CHANGE UP (ref 8.4–10.5)
CHLORIDE SERPL-SCNC: 111 MMOL/L — HIGH (ref 96–108)
CHLORIDE SERPL-SCNC: 111 MMOL/L — HIGH (ref 96–108)
CO2 SERPL-SCNC: 26 MMOL/L — SIGNIFICANT CHANGE UP (ref 22–31)
CO2 SERPL-SCNC: 26 MMOL/L — SIGNIFICANT CHANGE UP (ref 22–31)
CREAT SERPL-MCNC: 0.86 MG/DL — SIGNIFICANT CHANGE UP (ref 0.5–1.3)
CREAT SERPL-MCNC: 0.86 MG/DL — SIGNIFICANT CHANGE UP (ref 0.5–1.3)
EGFR: 93 ML/MIN/1.73M2 — SIGNIFICANT CHANGE UP
EGFR: 93 ML/MIN/1.73M2 — SIGNIFICANT CHANGE UP
GLUCOSE SERPL-MCNC: 94 MG/DL — SIGNIFICANT CHANGE UP (ref 70–99)
GLUCOSE SERPL-MCNC: 94 MG/DL — SIGNIFICANT CHANGE UP (ref 70–99)
HCT VFR BLD CALC: 35.4 % — SIGNIFICANT CHANGE UP (ref 34.5–45)
HCT VFR BLD CALC: 35.4 % — SIGNIFICANT CHANGE UP (ref 34.5–45)
HGB BLD-MCNC: 11.2 G/DL — LOW (ref 11.5–15.5)
HGB BLD-MCNC: 11.2 G/DL — LOW (ref 11.5–15.5)
MCHC RBC-ENTMCNC: 27.3 PG — SIGNIFICANT CHANGE UP (ref 27–34)
MCHC RBC-ENTMCNC: 27.3 PG — SIGNIFICANT CHANGE UP (ref 27–34)
MCHC RBC-ENTMCNC: 31.6 GM/DL — LOW (ref 32–36)
MCHC RBC-ENTMCNC: 31.6 GM/DL — LOW (ref 32–36)
MCV RBC AUTO: 86.1 FL — SIGNIFICANT CHANGE UP (ref 80–100)
MCV RBC AUTO: 86.1 FL — SIGNIFICANT CHANGE UP (ref 80–100)
NRBC # BLD: 0 /100 WBCS — SIGNIFICANT CHANGE UP (ref 0–0)
NRBC # BLD: 0 /100 WBCS — SIGNIFICANT CHANGE UP (ref 0–0)
PLATELET # BLD AUTO: 275 K/UL — SIGNIFICANT CHANGE UP (ref 150–400)
PLATELET # BLD AUTO: 275 K/UL — SIGNIFICANT CHANGE UP (ref 150–400)
POTASSIUM SERPL-MCNC: 4.3 MMOL/L — SIGNIFICANT CHANGE UP (ref 3.5–5.3)
POTASSIUM SERPL-MCNC: 4.3 MMOL/L — SIGNIFICANT CHANGE UP (ref 3.5–5.3)
POTASSIUM SERPL-SCNC: 4.3 MMOL/L — SIGNIFICANT CHANGE UP (ref 3.5–5.3)
POTASSIUM SERPL-SCNC: 4.3 MMOL/L — SIGNIFICANT CHANGE UP (ref 3.5–5.3)
RBC # BLD: 4.11 M/UL — SIGNIFICANT CHANGE UP (ref 3.8–5.2)
RBC # BLD: 4.11 M/UL — SIGNIFICANT CHANGE UP (ref 3.8–5.2)
RBC # FLD: 14 % — SIGNIFICANT CHANGE UP (ref 10.3–14.5)
RBC # FLD: 14 % — SIGNIFICANT CHANGE UP (ref 10.3–14.5)
SODIUM SERPL-SCNC: 140 MMOL/L — SIGNIFICANT CHANGE UP (ref 135–145)
SODIUM SERPL-SCNC: 140 MMOL/L — SIGNIFICANT CHANGE UP (ref 135–145)
WBC # BLD: 7.41 K/UL — SIGNIFICANT CHANGE UP (ref 3.8–10.5)
WBC # BLD: 7.41 K/UL — SIGNIFICANT CHANGE UP (ref 3.8–10.5)
WBC # FLD AUTO: 7.41 K/UL — SIGNIFICANT CHANGE UP (ref 3.8–10.5)
WBC # FLD AUTO: 7.41 K/UL — SIGNIFICANT CHANGE UP (ref 3.8–10.5)

## 2024-01-15 PROCEDURE — 99222 1ST HOSP IP/OBS MODERATE 55: CPT

## 2024-01-15 RX ORDER — GABAPENTIN 400 MG/1
300 CAPSULE ORAL THREE TIMES A DAY
Refills: 0 | Status: DISCONTINUED | OUTPATIENT
Start: 2024-01-15 | End: 2024-01-17

## 2024-01-15 RX ORDER — ACETAMINOPHEN 500 MG
1000 TABLET ORAL EVERY 6 HOURS
Refills: 0 | Status: DISCONTINUED | OUTPATIENT
Start: 2024-01-15 | End: 2024-01-17

## 2024-01-15 RX ORDER — ENOXAPARIN SODIUM 100 MG/ML
40 INJECTION SUBCUTANEOUS EVERY 12 HOURS
Refills: 0 | Status: DISCONTINUED | OUTPATIENT
Start: 2024-01-15 | End: 2024-01-17

## 2024-01-15 RX ORDER — SENNA PLUS 8.6 MG/1
2 TABLET ORAL AT BEDTIME
Refills: 0 | Status: DISCONTINUED | OUTPATIENT
Start: 2024-01-15 | End: 2024-01-17

## 2024-01-15 RX ORDER — OXYCODONE HYDROCHLORIDE 5 MG/1
5 TABLET ORAL EVERY 4 HOURS
Refills: 0 | Status: DISCONTINUED | OUTPATIENT
Start: 2024-01-15 | End: 2024-01-17

## 2024-01-15 RX ORDER — KETOROLAC TROMETHAMINE 30 MG/ML
30 SYRINGE (ML) INJECTION EVERY 6 HOURS
Refills: 0 | Status: DISCONTINUED | OUTPATIENT
Start: 2024-01-15 | End: 2024-01-15

## 2024-01-15 RX ORDER — ONDANSETRON 8 MG/1
4 TABLET, FILM COATED ORAL EVERY 8 HOURS
Refills: 0 | Status: DISCONTINUED | OUTPATIENT
Start: 2024-01-15 | End: 2024-01-17

## 2024-01-15 RX ORDER — CYCLOBENZAPRINE HYDROCHLORIDE 10 MG/1
10 TABLET, FILM COATED ORAL THREE TIMES A DAY
Refills: 0 | Status: DISCONTINUED | OUTPATIENT
Start: 2024-01-15 | End: 2024-01-17

## 2024-01-15 RX ORDER — LIDOCAINE 4 G/100G
1 CREAM TOPICAL DAILY
Refills: 0 | Status: DISCONTINUED | OUTPATIENT
Start: 2024-01-15 | End: 2024-01-17

## 2024-01-15 RX ORDER — ENOXAPARIN SODIUM 100 MG/ML
150 INJECTION SUBCUTANEOUS EVERY 24 HOURS
Refills: 0 | Status: DISCONTINUED | OUTPATIENT
Start: 2024-01-15 | End: 2024-01-15

## 2024-01-15 RX ORDER — OXYCODONE HYDROCHLORIDE 5 MG/1
5 TABLET ORAL ONCE
Refills: 0 | Status: DISCONTINUED | OUTPATIENT
Start: 2024-01-15 | End: 2024-01-15

## 2024-01-15 RX ORDER — PANTOPRAZOLE SODIUM 20 MG/1
40 TABLET, DELAYED RELEASE ORAL
Refills: 0 | Status: DISCONTINUED | OUTPATIENT
Start: 2024-01-15 | End: 2024-01-17

## 2024-01-15 RX ORDER — POLYETHYLENE GLYCOL 3350 17 G/17G
17 POWDER, FOR SOLUTION ORAL DAILY
Refills: 0 | Status: DISCONTINUED | OUTPATIENT
Start: 2024-01-15 | End: 2024-01-17

## 2024-01-15 RX ORDER — KETOROLAC TROMETHAMINE 30 MG/ML
30 SYRINGE (ML) INJECTION EVERY 6 HOURS
Refills: 0 | Status: DISCONTINUED | OUTPATIENT
Start: 2024-01-15 | End: 2024-01-16

## 2024-01-15 RX ADMIN — Medication 30 MILLIGRAM(S): at 13:15

## 2024-01-15 RX ADMIN — CYCLOBENZAPRINE HYDROCHLORIDE 10 MILLIGRAM(S): 10 TABLET, FILM COATED ORAL at 13:14

## 2024-01-15 RX ADMIN — ENOXAPARIN SODIUM 40 MILLIGRAM(S): 100 INJECTION SUBCUTANEOUS at 18:33

## 2024-01-15 RX ADMIN — Medication 30 MILLIGRAM(S): at 19:33

## 2024-01-15 RX ADMIN — Medication 30 MILLIGRAM(S): at 14:15

## 2024-01-15 RX ADMIN — CYCLOBENZAPRINE HYDROCHLORIDE 10 MILLIGRAM(S): 10 TABLET, FILM COATED ORAL at 21:59

## 2024-01-15 RX ADMIN — Medication 1000 MILLIGRAM(S): at 18:33

## 2024-01-15 RX ADMIN — OXYCODONE HYDROCHLORIDE 5 MILLIGRAM(S): 5 TABLET ORAL at 03:20

## 2024-01-15 RX ADMIN — GABAPENTIN 300 MILLIGRAM(S): 400 CAPSULE ORAL at 13:14

## 2024-01-15 RX ADMIN — Medication 650 MILLIGRAM(S): at 01:44

## 2024-01-15 RX ADMIN — Medication 1000 MILLIGRAM(S): at 13:13

## 2024-01-15 RX ADMIN — OXYCODONE HYDROCHLORIDE 5 MILLIGRAM(S): 5 TABLET ORAL at 04:30

## 2024-01-15 RX ADMIN — Medication 1000 MILLIGRAM(S): at 14:13

## 2024-01-15 RX ADMIN — Medication 650 MILLIGRAM(S): at 00:44

## 2024-01-15 RX ADMIN — SENNA PLUS 2 TABLET(S): 8.6 TABLET ORAL at 21:59

## 2024-01-15 RX ADMIN — LIDOCAINE 1 PATCH: 4 CREAM TOPICAL at 13:14

## 2024-01-15 RX ADMIN — Medication 30 MILLIGRAM(S): at 18:33

## 2024-01-15 RX ADMIN — ONDANSETRON 4 MILLIGRAM(S): 8 TABLET, FILM COATED ORAL at 21:59

## 2024-01-15 RX ADMIN — GABAPENTIN 300 MILLIGRAM(S): 400 CAPSULE ORAL at 21:59

## 2024-01-15 RX ADMIN — Medication 1000 MILLIGRAM(S): at 19:33

## 2024-01-15 RX ADMIN — LIDOCAINE 1 PATCH: 4 CREAM TOPICAL at 19:20

## 2024-01-15 RX ADMIN — POLYETHYLENE GLYCOL 3350 17 GRAM(S): 17 POWDER, FOR SOLUTION ORAL at 13:13

## 2024-01-15 RX ADMIN — CYCLOBENZAPRINE HYDROCHLORIDE 5 MILLIGRAM(S): 10 TABLET, FILM COATED ORAL at 00:43

## 2024-01-15 NOTE — CONSULT NOTE ADULT - ASSESSMENT
It doesn't appear that she has a limited lumbosacral disc disease alone. 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 Recommend investigation of demyelinating spine and optic nerve disorder ( Multiple Sclerosis or Neuromyelitis Optica/Opticospinal demyelinating disease). Recommend MRI C spine and T spine w/wo, MRI lumbar spine wo and MRI head w/wo, including sagittal T2 FLAIR views and o attention bilateral optic nerves. Check blood Neuromyelitis Optica antibody level and Myelin Associated Glycoprotein antibody.

## 2024-01-15 NOTE — CONSULT NOTE ADULT - SUBJECTIVE AND OBJECTIVE BOX
Patient is a 33y old  Female who presents with a chief complaint of     HPI:  Back pain that developed on Saturday. Moderately severe radiating into both thighs up to the knees only. The next day - Sunday 14th she found she could not stand and she had numbness in both fingers as well as a pain in the left side of the back radiating to the front of the abdomen above the umbilicus. She admits problems with eyesight that have not been corrected by refraction and she is scheduled to visit an ophthalmologist again. Vision problems may have begun a year ago. She denies bladder or bowel problems  PAST MEDICAL & SURGICAL HISTORY:      FAMILY HISTORY:        Social Hx:  Nonsmoker, no drug or alcohol use    MEDICATIONS  (STANDING):  acetaminophen     Tablet .. 1000 milliGRAM(s) Oral every 6 hours  cyclobenzaprine 10 milliGRAM(s) Oral three times a day  gabapentin 300 milliGRAM(s) Oral three times a day  ketorolac   Injectable 30 milliGRAM(s) IV Push every 6 hours  lidocaine   4% Patch 1 Patch Transdermal daily  pantoprazole    Tablet 40 milliGRAM(s) Oral before breakfast  polyethylene glycol 3350 17 Gram(s) Oral daily  senna 2 Tablet(s) Oral at bedtime       Allergies    cefazolin (Hives)    Intolerances        ROS: Pertinent positives in HPI, all other ROS were reviewed and are negative.      Vital Signs Last 24 Hrs  T(C): 36.7 (15 Gonzalo 2024 05:01), Max: 37 (14 Jan 2024 19:32)  T(F): 98.1 (15 Gonzalo 2024 05:01), Max: 98.6 (14 Jan 2024 19:32)  HR: 85 (15 Gonzalo 2024 05:01) (73 - 94)  BP: 118/65 (15 Gonzalo 2024 05:01) (112/72 - 131/86)  BP(mean): --  RR: 15 (15 Gonzalo 2024 05:01) (15 - 20)  SpO2: 98% (15 Gonzalo 2024 05:01) (97% - 100%)    Parameters below as of 15 Gonzalo 2024 05:01  Patient On (Oxygen Delivery Method): room air            Constitutional: awake and alert.  HEENT: PERRLA, EOMI,   Neck: Supple.  Respiratory: Breath sounds are clear bilaterally  Cardiovascular: S1 and S2, regular / irregular rhythm  Gastrointestinal: soft, nontender  Extremities:  no edema  Vascular: Caritid Bruit - no  Musculoskeletal: no joint swelling/tenderness, no abnormal movements  Skin: No rashes    Neurological exam:  HF: A x O x 3. Appropriately interactive, normal affect. Speech fluent, No Aphasia or paraphasic errors. Naming /repetition intact   CN: APD OD diminished color vision OD, VA 20/40 OD and 20/30 OS without correction, EOMI, VFF, facial sensation normal, no NLFD, tongue midline, Palate moves equally, SCM equal bilaterally  Motor: No pronator drift, Strength 5/5 in all 4 ext except hip adductors and abductors, normal bulk and tone, no tremor, rigidity or bradykinesia.    Sens: Diminished light touch, pin and temperature below T5 level bilaterally   Reflexes: Symmetric and normal . BJ 2+, BR 2+, KJ 2+, AJ 2+, downgoing toes b/l  Coord:  No FNFA, dysmetria, GENOVEVA intact   Gait/Balance: Cannot test    NIHSS:          Labs:                        11.2   7.41  )-----------( 275      ( 15 Gonzalo 2024 05:07 )             35.4     01-15    140  |  111<H>  |  15  ----------------------------<  94  4.3   |  26  |  0.86    Ca    9.2      15 Gonzalo 2024 05:07              Radiology report:  < from: CT Angio Chest PE Protocol w/ IV Cont (09.03.23 @ 08:23) >  ACC: [id number] [facility]   ORDERED BY:   CHARISSE BE     PROCEDURE DATE:  09/03/2023          INTERPRETATION:  CLINICAL INFORMATION: Evaluate for pulmonary embolus.    COMPARISON: None.    CONTRAST/COMPLICATIONS:  IV Contrast: Omnipaque 350  50 cc administered   0 cc discarded  Oral Contrast: NONE  Complications: None reported at time of study completion    PROCEDURE:  CT Angiography of the Chest.  Sagittal and coronal reformats were performed as well as 3D (MIP)   reconstructions.    FINDINGS:    LUNGS AND AIRWAYS: Central airways are patent. No large focal   consolidation.  PLEURA: No pleural effusion or pneumothorax  MEDIASTINUM AND REA: No enlarged lymph nodes of the thorax  VESSELS: No pulmonary embolus upto segmental level. Subsegmental   pulmonary artery branches are incompletely evaluated due to poor   opacification. Main pulmonary artery is enlarged measuring 3.4 cm. This   may reflect pulmonary arterial hypertension. Normal caliber of the   thoracic aorta.  HEART: Heart size is qualitatively within normal limits. Trace   pericardial fluid.  CHEST WALL AND LOWER NECK: Findings of the neck are reviewed and reported   on separately. Please refer to separate report. No chest wall hematoma.   Partially imaged left breast with 12 mm nodular density on image 79   series 7, of unclear etiology. Recommend mammogram/ultrasound at a   dedicated breast imaging center.  VISUALIZED UPPER ABDOMEN: Subcentimeter hepatic hypodensity is too small   to characterize. Small volume nodes of the partially imaged upper abdomen   under 1 cm in short axis.  BONES: No aggressive osseous lesion.    IMPRESSION:    No pulmonary embolus up to segmental level. Subsegmental pulmonary artery   branches are incompletely evaluated due to poor opacification. Enlarged   main pulmonary artery may reflect pulmonary arterial hypertension.    Partially imaged left breast with 12 mm nodular density on image 79   series 7, of unclear etiology. Recommend mammogram/ultrasoundat a   dedicated breast imaging center.    Findings of the neck are reviewed and reported on separately. Please   refer to separate report.    --- End of Report ---            EDGAR ARTIS M.D., ATTENDING RADIOLOGIST  This document has been electronically signed. Sep  3 2023  8:32AM    < end of copied text >    < from: CT Neck Soft Tissue w/ IV Cont (09.03.23 @ 08:23) >    ACC: [id number] EXAM:  CT NECK SOFT TISSUE IC   ORDERED BY: CHARISSE BE     PROCEDURE DATE:  09/03/2023          INTERPRETATION:  CT neck with and without IV contrast    CLINICAL INFORMATION: RIGHT-sided neck swelling    TECHNIQUE:   First, axial images were obtained through the neck as a   single helical acquisition and reconstructed at 3 thickness.  Second,   contiguous axial 3 mm thick sections were obtained through the neck using   single helical acquisition.  Images were acquired during the intravenous   administration of 90 cc of Omnipaque 350/ 10 cc discarded.  Image data   was reconstructed in the 3 mm sagittal and coronal planes.   This scan   was performed using automatic exposure control (radiation dose reduction   software) to obtain a diagnostic image quality scan with patient dose as   low as reasonably achievable.    FINDINGS:   No prior similar studies are available for review.    Scattered lymph nodes noted throughout the neck, the largest measuring   1.3 x 2.1 cm in the RIGHT level 2A region. The remaining lymph nodes do   not meet size criteria for pathology and these are likely reactive in   nature secondary to inflammation/infection.    The mucosal surfaces of the upper aerodigestive tract demonstrate   physiologic mucosal enhancement and appear symmetric and unremarkable.    The larynx is intact.  The preepiglottic and paralaryngeal spaces are   intact.  Laryngeal cartilages remain intact.    The nasopharynx is symmetric.  No lateral retropharyngeal mass is found.    The underlying central skull base is intact.  The petrous temporal bones   and mastoids remain clear.  The visualized base of brain appears   unremarkable.    The parotid and submandibular glands are intact.  The thyroid gland is   intact.    The visualized paranasal sinuses are significant for a 1.7 cm retention   cyst in the RIGHT maxillary sinus.  The nasal cavity is unremarkable.    The cervical spine appears intact.    The carotid and vertebral arteries demonstrate enhancement indicating   their patency.    The lung apices are clear, allowing for the for the neck CT technique.      IMPRESSION: Scattered lymph nodes noted throughout the neck, the largest   measuring 1.3 x 2.1 cm in the RIGHT level 2A region. The remaining lymph  nodes do not meet size criteria for pathology and these are likely   reactive in nature secondary to inflammation/infection.      --- End of Report ---      < end of copied text >  < from: Xray Lumbosacral Spine (01.14.24 @ 18:03) >    PROCEDURE DATE:  01/14/2024          INTERPRETATION:  CLINICAL HISTORY: Pain.    Lumbosacral Spine.    Frontal, lateral and cone-down projections demonstrate satisfactory  alignment of the bony structures. There is a mild, rotatory,   thoracolumbar scoliosis convexity to the patient's left. No fracture or   vertebral compression can be appreciated. There is no evidence of pedicle   destruction. There is mild disc space narrowing at the L3-4, L4-5 and   L5-S1 levels. The sacroiliac joints are symmetric.    IMPRESSION: Mild degenerative disc disease. If clinically indicated,   further assessment with MRI is recommended.    --- End of Report ---            AMINTA LORENZANA MD; Attending Radiologist  This document has been electronically signed. Gonzalo 15 2024  9:57AM    < end of copied text >   Patient is a 33y old  Female who presents with a chief complaint of     HPI:  Back pain that developed on Saturday. Moderately severe radiating into both thighs up to the knees only. The next day - Sunday 14th she found she could not stand and she had numbness in both fingers as well as a pain in the left side of the back radiating to the front of the abdomen above the umbilicus. She admits problems with eyesight that have not been corrected by refraction and she is scheduled to visit an ophthalmologist again. Vision problems may have begun a year ago. She denies bladder or bowel problems  PAST MEDICAL & SURGICAL HISTORY:      FAMILY HISTORY:        Social Hx:  Nonsmoker, no drug or alcohol use    MEDICATIONS  (STANDING):  acetaminophen     Tablet .. 1000 milliGRAM(s) Oral every 6 hours  cyclobenzaprine 10 milliGRAM(s) Oral three times a day  gabapentin 300 milliGRAM(s) Oral three times a day  ketorolac   Injectable 30 milliGRAM(s) IV Push every 6 hours  lidocaine   4% Patch 1 Patch Transdermal daily  pantoprazole    Tablet 40 milliGRAM(s) Oral before breakfast  polyethylene glycol 3350 17 Gram(s) Oral daily  senna 2 Tablet(s) Oral at bedtime       Allergies    cefazolin (Hives)    Intolerances        ROS: Pertinent positives in HPI, all other ROS were reviewed and are negative.      Vital Signs Last 24 Hrs  T(C): 36.7 (15 Gonzalo 2024 05:01), Max: 37 (14 Jan 2024 19:32)  T(F): 98.1 (15 Gonzalo 2024 05:01), Max: 98.6 (14 Jan 2024 19:32)  HR: 85 (15 Gonzalo 2024 05:01) (73 - 94)  BP: 118/65 (15 Gonzalo 2024 05:01) (112/72 - 131/86)  BP(mean): --  RR: 15 (15 Gonzalo 2024 05:01) (15 - 20)  SpO2: 98% (15 Gonzalo 2024 05:01) (97% - 100%)    Parameters below as of 15 Gonzalo 2024 05:01  Patient On (Oxygen Delivery Method): room air            Constitutional: awake and alert.  HEENT: PERRLA, EOMI,   Neck: Supple.  Respiratory: Breath sounds are clear bilaterally  Cardiovascular: S1 and S2, regular / irregular rhythm  Gastrointestinal: soft, nontender  Extremities:  no edema  Vascular: Caritid Bruit - no  Musculoskeletal: no joint swelling/tenderness, no abnormal movements  Skin: No rashes    Neurological exam:  HF: A x O x 3. Appropriately interactive, normal affect. Speech fluent, No Aphasia or paraphasic errors. Naming /repetition intact   CN: APD OD diminished color vision OD, VA 20/40 OD and 20/30 OS without correction, EOMI, VFF, facial sensation normal, no NLFD, tongue midline, Palate moves equally, SCM equal bilaterally  Motor: No pronator drift, Strength 5/5 in all 4 ext except hip adductors and abductors, normal bulk and tone, no tremor, rigidity or bradykinesia.    Sens: Diminished light touch, pin and temperature below T5 level bilaterally   Reflexes: Symmetric and normal . BJ 2+, BR 2+, KJ 2+, AJ 2+, downgoing toes b/l  Coord:  No FNFA, dysmetria, GENOVEVA intact   Gait/Balance: Cannot test    NIHSS:          Labs:                        11.2   7.41  )-----------( 275      ( 15 Gonzalo 2024 05:07 )             35.4     01-15    140  |  111<H>  |  15  ----------------------------<  94  4.3   |  26  |  0.86    Ca    9.2      15 Gonzalo 2024 05:07              Radiology report:  < from: CT Angio Chest PE Protocol w/ IV Cont (09.03.23 @ 08:23) >  ACC: [id number] [facility]   ORDERED BY:   CHARISSE BE     PROCEDURE DATE:  09/03/2023          INTERPRETATION:  CLINICAL INFORMATION: Evaluate for pulmonary embolus.    COMPARISON: None.    CONTRAST/COMPLICATIONS:  IV Contrast: Omnipaque 350  50 cc administered   0 cc discarded  Oral Contrast: NONE  Complications: None reported at time of study completion    PROCEDURE:  CT Angiography of the Chest.  Sagittal and coronal reformats were performed as well as 3D (MIP)   reconstructions.    FINDINGS:    LUNGS AND AIRWAYS: Central airways are patent. No large focal   consolidation.  PLEURA: No pleural effusion or pneumothorax  MEDIASTINUM AND REA: No enlarged lymph nodes of the thorax  VESSELS: No pulmonary embolus upto segmental level. Subsegmental   pulmonary artery branches are incompletely evaluated due to poor   opacification. Main pulmonary artery is enlarged measuring 3.4 cm. This   may reflect pulmonary arterial hypertension. Normal caliber of the   thoracic aorta.  HEART: Heart size is qualitatively within normal limits. Trace   pericardial fluid.  CHEST WALL AND LOWER NECK: Findings of the neck are reviewed and reported   on separately. Please refer to separate report. No chest wall hematoma.   Partially imaged left breast with 12 mm nodular density on image 79   series 7, of unclear etiology. Recommend mammogram/ultrasound at a   dedicated breast imaging center.  VISUALIZED UPPER ABDOMEN: Subcentimeter hepatic hypodensity is too small   to characterize. Small volume nodes of the partially imaged upper abdomen   under 1 cm in short axis.  BONES: No aggressive osseous lesion.    IMPRESSION:    No pulmonary embolus up to segmental level. Subsegmental pulmonary artery   branches are incompletely evaluated due to poor opacification. Enlarged   main pulmonary artery may reflect pulmonary arterial hypertension.    Partially imaged left breast with 12 mm nodular density on image 79   series 7, of unclear etiology. Recommend mammogram/ultrasoundat a   dedicated breast imaging center.    Findings of the neck are reviewed and reported on separately. Please   refer to separate report.    --- End of Report ---            EDGAR ARTIS M.D., ATTENDING RADIOLOGIST  This document has been electronically signed. Sep  3 2023  8:32AM    < end of copied text >    < from: CT Neck Soft Tissue w/ IV Cont (09.03.23 @ 08:23) >    ACC: [id number] EXAM:  CT NECK SOFT TISSUE IC   ORDERED BY: CHARISSE BE     PROCEDURE DATE:  09/03/2023          INTERPRETATION:  CT neck with and without IV contrast    CLINICAL INFORMATION: RIGHT-sided neck swelling    TECHNIQUE:   First, axial images were obtained through the neck as a   single helical acquisition and reconstructed at 3 thickness.  Second,   contiguous axial 3 mm thick sections were obtained through the neck using   single helical acquisition.  Images were acquired during the intravenous   administration of 90 cc of Omnipaque 350/ 10 cc discarded.  Image data   was reconstructed in the 3 mm sagittal and coronal planes.   This scan   was performed using automatic exposure control (radiation dose reduction   software) to obtain a diagnostic image quality scan with patient dose as   low as reasonably achievable.    FINDINGS:   No prior similar studies are available for review.    Scattered lymph nodes noted throughout the neck, the largest measuring   1.3 x 2.1 cm in the RIGHT level 2A region. The remaining lymph nodes do   not meet size criteria for pathology and these are likely reactive in   nature secondary to inflammation/infection.    The mucosal surfaces of the upper aerodigestive tract demonstrate   physiologic mucosal enhancement and appear symmetric and unremarkable.    The larynx is intact.  The preepiglottic and paralaryngeal spaces are   intact.  Laryngeal cartilages remain intact.    The nasopharynx is symmetric.  No lateral retropharyngeal mass is found.    The underlying central skull base is intact.  The petrous temporal bones   and mastoids remain clear.  The visualized base of brain appears   unremarkable.    The parotid and submandibular glands are intact.  The thyroid gland is   intact.    The visualized paranasal sinuses are significant for a 1.7 cm retention   cyst in the RIGHT maxillary sinus.  The nasal cavity is unremarkable.    The cervical spine appears intact.    The carotid and vertebral arteries demonstrate enhancement indicating   their patency.    The lung apices are clear, allowing for the for the neck CT technique.      IMPRESSION: Scattered lymph nodes noted throughout the neck, the largest   measuring 1.3 x 2.1 cm in the RIGHT level 2A region. The remaining lymph  nodes do not meet size criteria for pathology and these are likely   reactive in nature secondary to inflammation/infection.      --- End of Report ---      < end of copied text >  < from: Xray Lumbosacral Spine (01.14.24 @ 18:03) >    PROCEDURE DATE:  01/14/2024          INTERPRETATION:  CLINICAL HISTORY: Pain.    Lumbosacral Spine.    Frontal, lateral and cone-down projections demonstrate satisfactory  alignment of the bony structures. There is a mild, rotatory,   thoracolumbar scoliosis convexity to the patient's left. No fracture or   vertebral compression can be appreciated. There is no evidence of pedicle   destruction. There is mild disc space narrowing at the L3-4, L4-5 and   L5-S1 levels. The sacroiliac joints are symmetric.    IMPRESSION: Mild degenerative disc disease. If clinically indicated,   further assessment with MRI is recommended.    --- End of Report ---            AMINTA LORENZANA MD; Attending Radiologist  This document has been electronically signed. Gonzalo 15 2024  9:57AM    < end of copied text >   Patient is a 33y old  Female who presents with a chief complaint of     HPI:  Back pain that developed on Saturday. Moderately severe radiating into both thighs up to the knees only. The next day - Sunday 14th she found she could not stand and she had numbness in both fingers as well as a pain in the left side of the back radiating to the front of the abdomen above the umbilicus. She admits problems with eyesight that have not been corrected by refraction and she is scheduled to visit an ophthalmologist again. Vision problems may have begun a year ago. She denies bladder or bowel problems  PAST MEDICAL & SURGICAL HISTORY:      FAMILY HISTORY:        Social Hx:  Nonsmoker, no drug or alcohol use    MEDICATIONS  (STANDING):  acetaminophen     Tablet .. 1000 milliGRAM(s) Oral every 6 hours  cyclobenzaprine 10 milliGRAM(s) Oral three times a day  gabapentin 300 milliGRAM(s) Oral three times a day  ketorolac   Injectable 30 milliGRAM(s) IV Push every 6 hours  lidocaine   4% Patch 1 Patch Transdermal daily  pantoprazole    Tablet 40 milliGRAM(s) Oral before breakfast  polyethylene glycol 3350 17 Gram(s) Oral daily  senna 2 Tablet(s) Oral at bedtime       Allergies    cefazolin (Hives)    Intolerances        ROS: Pertinent positives in HPI, all other ROS were reviewed and are negative.      Vital Signs Last 24 Hrs  T(C): 36.7 (15 Gonzalo 2024 05:01), Max: 37 (14 Jan 2024 19:32)  T(F): 98.1 (15 Gonzalo 2024 05:01), Max: 98.6 (14 Jan 2024 19:32)  HR: 85 (15 Gonzalo 2024 05:01) (73 - 94)  BP: 118/65 (15 Gonzalo 2024 05:01) (112/72 - 131/86)  BP(mean): --  RR: 15 (15 Gonzalo 2024 05:01) (15 - 20)  SpO2: 98% (15 Gonzalo 2024 05:01) (97% - 100%)    Parameters below as of 15 Gonzalo 2024 05:01  Patient On (Oxygen Delivery Method): room air            Constitutional: awake and alert.  HEENT: PERRLA, EOMI,   Neck: Supple.  Respiratory: Breath sounds are clear bilaterally  Cardiovascular: S1 and S2, regular / irregular rhythm  Gastrointestinal: soft, nontender  Extremities:  no edema  Vascular: Caritid Bruit - no  Musculoskeletal: no joint swelling/tenderness, no abnormal movements  Skin: No rashes    Neurological exam:  HF: A x O x 3. Appropriately interactive, normal affect. Speech fluent, No Aphasia or paraphasic errors. Naming /repetition intact   CN: APD OD diminished color vision OD, VA 20/40 OD and 20/30 OS without correction, EOMI, VFF, facial sensation normal, no NLFD, tongue midline, Palate moves equally, SCM equal bilaterally  Motor: No pronator drift, Strength 5/5 in all 4 ext except hip adductors and abductors, normal bulk and tone, no tremor, rigidity or bradykinesia.    Sens: Diminished light touch, pin and temperature below T5 level bilaterally   Reflexes: Symmetric and normal . BJ 2+, BR 2+, KJ 2+, AJ 2+, downgoing toes b/l  Coord:  No FNFA, dysmetria, GENOVEVA intact   Gait/Balance: Cannot test    NIHSS:          Labs:                        11.2   7.41  )-----------( 275      ( 15 Gonzalo 2024 05:07 )             35.4     01-15    140  |  111<H>  |  15  ----------------------------<  94  4.3   |  26  |  0.86    Ca    9.2      15 Gonzalo 2024 05:07              Radiology report:  < from: CT Angio Chest PE Protocol w/ IV Cont (09.03.23 @ 08:23) >  ACC: [id number] [facility]  [facility]   ORDERED BY:   CHARISSE BE     PROCEDURE DATE:  09/03/2023          INTERPRETATION:  CLINICAL INFORMATION: Evaluate for pulmonary embolus.    COMPARISON: None.    CONTRAST/COMPLICATIONS:  IV Contrast: Omnipaque 350  50 cc administered   0 cc discarded  Oral Contrast: NONE  Complications: None reported at time of study completion    PROCEDURE:  CT Angiography of the Chest.  Sagittal and coronal reformats were performed as well as 3D (MIP)   reconstructions.    FINDINGS:    LUNGS AND AIRWAYS: Central airways are patent. No large focal   consolidation.  PLEURA: No pleural effusion or pneumothorax  MEDIASTINUM AND REA: No enlarged lymph nodes of the thorax  VESSELS: No pulmonary embolus upto segmental level. Subsegmental   pulmonary artery branches are incompletely evaluated due to poor   opacification. Main pulmonary artery is enlarged measuring 3.4 cm. This   may reflect pulmonary arterial hypertension. Normal caliber of the   thoracic aorta.  HEART: Heart size is qualitatively within normal limits. Trace   pericardial fluid.  CHEST WALL AND LOWER NECK: Findings of the neck are reviewed and reported   on separately. Please refer to separate report. No chest wall hematoma.   Partially imaged left breast with 12 mm nodular density on image 79   series 7, of unclear etiology. Recommend mammogram/ultrasound at a   dedicated breast imaging center.  VISUALIZED UPPER ABDOMEN: Subcentimeter hepatic hypodensity is too small   to characterize. Small volume nodes of the partially imaged upper abdomen   under 1 cm in short axis.  BONES: No aggressive osseous lesion.    IMPRESSION:    No pulmonary embolus up to segmental level. Subsegmental pulmonary artery   branches are incompletely evaluated due to poor opacification. Enlarged   main pulmonary artery may reflect pulmonary arterial hypertension.    Partially imaged left breast with 12 mm nodular density on image 79   series 7, of unclear etiology. Recommend mammogram/ultrasoundat a   dedicated breast imaging center.    Findings of the neck are reviewed and reported on separately. Please   refer to separate report.    --- End of Report ---            EDGAR ARTIS M.D., ATTENDING RADIOLOGIST  This document has been electronically signed. Sep  3 2023  8:32AM    < end of copied text >    < from: CT Neck Soft Tissue w/ IV Cont (09.03.23 @ 08:23) >    ACC: [id number] EXAM:  CT NECK SOFT TISSUE IC   ORDERED BY: CHARISSE BE     PROCEDURE DATE:  09/03/2023          INTERPRETATION:  CT neck with and without IV contrast    CLINICAL INFORMATION: RIGHT-sided neck swelling    TECHNIQUE:   First, axial images were obtained through the neck as a   single helical acquisition and reconstructed at 3 thickness.  Second,   contiguous axial 3 mm thick sections were obtained through the neck using   single helical acquisition.  Images were acquired during the intravenous   administration of 90 cc of Omnipaque 350/ 10 cc discarded.  Image data   was reconstructed in the 3 mm sagittal and coronal planes.   This scan   was performed using automatic exposure control (radiation dose reduction   software) to obtain a diagnostic image quality scan with patient dose as   low as reasonably achievable.    FINDINGS:   No prior similar studies are available for review.    Scattered lymph nodes noted throughout the neck, the largest measuring   1.3 x 2.1 cm in the RIGHT level 2A region. The remaining lymph nodes do   not meet size criteria for pathology and these are likely reactive in   nature secondary to inflammation/infection.    The mucosal surfaces of the upper aerodigestive tract demonstrate   physiologic mucosal enhancement and appear symmetric and unremarkable.    The larynx is intact.  The preepiglottic and paralaryngeal spaces are   intact.  Laryngeal cartilages remain intact.    The nasopharynx is symmetric.  No lateral retropharyngeal mass is found.    The underlying central skull base is intact.  The petrous temporal bones   and mastoids remain clear.  The visualized base of brain appears   unremarkable.    The parotid and submandibular glands are intact.  The thyroid gland is   intact.    The visualized paranasal sinuses are significant for a 1.7 cm retention   cyst in the RIGHT maxillary sinus.  The nasal cavity is unremarkable.    The cervical spine appears intact.    The carotid and vertebral arteries demonstrate enhancement indicating   their patency.    The lung apices are clear, allowing for the for the neck CT technique.      IMPRESSION: Scattered lymph nodes noted throughout the neck, the largest   measuring 1.3 x 2.1 cm in the RIGHT level 2A region. The remaining lymph  nodes do not meet size criteria for pathology and these are likely   reactive in nature secondary to inflammation/infection.      --- End of Report ---      < end of copied text >  < from: Xray Lumbosacral Spine (01.14.24 @ 18:03) >    PROCEDURE DATE:  01/14/2024          INTERPRETATION:  CLINICAL HISTORY: Pain.    Lumbosacral Spine.    Frontal, lateral and cone-down projections demonstrate satisfactory  alignment of the bony structures. There is a mild, rotatory,   thoracolumbar scoliosis convexity to the patient's left. No fracture or   vertebral compression can be appreciated. There is no evidence of pedicle   destruction. There is mild disc space narrowing at the L3-4, L4-5 and   L5-S1 levels. The sacroiliac joints are symmetric.    IMPRESSION: Mild degenerative disc disease. If clinically indicated,   further assessment with MRI is recommended.    --- End of Report ---            AMINTA LORENZANA MD; Attending Radiologist  This document has been electronically signed. Gonzalo 15 2024  9:57AM    < end of copied text >

## 2024-01-15 NOTE — PROGRESS NOTE ADULT - SUBJECTIVE AND OBJECTIVE BOX
PGY-1 Progress Note discussed with attending    PAGER #: [[phone]] TILL 5:00 PM  PLEASE CONTACT ON CALL TEAM:  - On Call Team (Please refer to Albino) FROM 5:00 PM - 8:30PM  - Nightfloat Team FROM 8:30 -7:30 AM    CHIEF COMPLAINT & BRIEF HOSPITAL COURSE:    INTERVAL HPI/OVERNIGHT EVENTS:   MEDICATIONS  (STANDING):  acetaminophen     Tablet .. 1000 milliGRAM(s) Oral every 6 hours  cyclobenzaprine 10 milliGRAM(s) Oral three times a day  gabapentin 300 milliGRAM(s) Oral three times a day  ketorolac   Injectable 30 milliGRAM(s) IV Push every 6 hours  lidocaine   4% Patch 1 Patch Transdermal daily  pantoprazole    Tablet 40 milliGRAM(s) Oral before breakfast  polyethylene glycol 3350 17 Gram(s) Oral daily  senna 2 Tablet(s) Oral at bedtime    MEDICATIONS  (PRN):  ondansetron Injectable 4 milliGRAM(s) IV Push every 8 hours PRN Nausea and/or Vomiting  oxyCODONE    IR 5 milliGRAM(s) Oral every 4 hours PRN Severe Pain (7 - 10)      REVIEW OF SYSTEMS:  CONSTITUTIONAL:  No fevers or chills, good appetite, good general state  EYES/ENT:  No visual changes;  No vertigo or throat pain   NECK:  No neck pain or stiffness  RESPIRATORY:  No cough, wheezing, hemoptysis; No shortness of breath  CARDIOVASCULAR:  No chest pain or palpitations  GASTROINTESTINAL:  No abdominal pain. No nausea, vomiting, or hematemesis; No diarrhea or constipation. No melena or hematochezia.  GENITOURINARY:  No dysuria, frequency or hematuria  NEUROLOGICAL:  +severe lower back pain radiating down inner and outer thighs   MSK: no back pain, no joint pain  SKIN:  No itching, no skin rash    Vital Signs Last 24 Hrs  T(C): 36.7 (15 Gonzalo 2024 05:01), Max: 37 (14 Jan 2024 19:32)  T(F): 98.1 (15 Gonzalo 2024 05:01), Max: 98.6 (14 Jan 2024 19:32)  HR: 85 (15 Gonzalo 2024 05:01) (73 - 94)  BP: 118/65 (15 Gonzalo 2024 05:01) (112/72 - 131/86)  BP(mean): --  RR: 15 (15 Gonzalo 2024 05:01) (15 - 20)  SpO2: 98% (15 Gonzalo 2024 05:01) (97% - 100%)    Parameters below as of 15 Gonzalo 2024 05:01  Patient On (Oxygen Delivery Method): room air        PHYSICAL EXAM:  VITALS: Vital Signs Last 24 Hrs  T(C): 36.7 (15 Gonzalo 2024 05:01), Max: 37 (14 Jan 2024 19:32)  T(F): 98.1 (15 Gonzalo 2024 05:01), Max: 98.6 (14 Jan 2024 19:32)  HR: 85 (15 Gonzalo 2024 05:01) (73 - 94)  BP: 118/65 (15 Gonzalo 2024 05:01) (112/72 - 131/86)  BP(mean): --  RR: 15 (15 Gonzalo 2024 05:01) (15 - 20)  SpO2: 98% (15 Gonzalo 2024 05:01) (97% - 100%)    Parameters below as of 15 Gonzalo 2024 05:01  Patient On (Oxygen Delivery Method): room air      Gen: AOx3, NAD, non-toxic, pleasant  HEAD:  Atraumatic, Normocephalic  EYES: PERRLA, conjunctiva and sclera clear  ENT: Moist mucous membranes  NECK: Supple, No JVD  CV: S1+S2 normal, no murmurs   Resp: Clear bilat, no resp distress, no crackles/wheezes  Abd: Soft, nontender, +BS  Ext: No LE edema, no cyanosis, LE pulses present  : No wynne  IV/Skin: No thrombophlebitis, no skin rash  Msk: No arthralgias, no joint swelling  Neuro: AAOx3. No sensory deficits, no motor deficits                          11.2   7.41  )-----------( 275      ( 15 Gonzalo 2024 05:07 )             35.4     01-15    140  |  111<H>  |  15  ----------------------------<  94  4.3   |  26  |  0.86    Ca    9.2      15 Gonzalo 2024 05:07                CAPILLARY BLOOD GLUCOSE      RADIOLOGY & ADDITIONAL TESTS:                   PGY-1 Progress Note discussed with attending    PAGER #: [[phone]] TILL 5:00 PM  PLEASE CONTACT ON CALL TEAM:  - On Call Team (Please refer to Albino) FROM 5:00 PM - 8:30PM  - Nightfloat Team FROM 8:30 -7:30 AM    CHIEF COMPLAINT & BRIEF HOSPITAL COURSE:    INTERVAL HPI/OVERNIGHT EVENTS:   MEDICATIONS  (STANDING):  acetaminophen     Tablet .. 1000 milliGRAM(s) Oral every 6 hours  cyclobenzaprine 10 milliGRAM(s) Oral three times a day  gabapentin 300 milliGRAM(s) Oral three times a day  ketorolac   Injectable 30 milliGRAM(s) IV Push every 6 hours  lidocaine   4% Patch 1 Patch Transdermal daily  pantoprazole    Tablet 40 milliGRAM(s) Oral before breakfast  polyethylene glycol 3350 17 Gram(s) Oral daily  senna 2 Tablet(s) Oral at bedtime    MEDICATIONS  (PRN):  ondansetron Injectable 4 milliGRAM(s) IV Push every 8 hours PRN Nausea and/or Vomiting  oxyCODONE    IR 5 milliGRAM(s) Oral every 4 hours PRN Severe Pain (7 - 10)      REVIEW OF SYSTEMS:  CONSTITUTIONAL:  No fevers or chills, good appetite, good general state  EYES/ENT:  No visual changes;  No vertigo or throat pain   NECK:  No neck pain or stiffness  RESPIRATORY:  No cough, wheezing, hemoptysis; No shortness of breath  CARDIOVASCULAR:  No chest pain or palpitations  GASTROINTESTINAL:  No abdominal pain. No nausea, vomiting, or hematemesis; No diarrhea or constipation. No melena or hematochezia.  GENITOURINARY:  No dysuria, frequency or hematuria  NEUROLOGICAL:  +severe lower back pain radiating down inner and outer thighs   MSK: no back pain, no joint pain  SKIN:  No itching, no skin rash    Vital Signs Last 24 Hrs  T(C): 36.7 (15 Gonzalo 2024 05:01), Max: 37 (14 Jan 2024 19:32)  T(F): 98.1 (15 Gonzalo 2024 05:01), Max: 98.6 (14 Jan 2024 19:32)  HR: 85 (15 Gonzalo 2024 05:01) (73 - 94)  BP: 118/65 (15 Gonzalo 2024 05:01) (112/72 - 131/86)  BP(mean): --  RR: 15 (15 Gonzalo 2024 05:01) (15 - 20)  SpO2: 98% (15 Gonzalo 2024 05:01) (97% - 100%)    Parameters below as of 15 Gonzalo 2024 05:01  Patient On (Oxygen Delivery Method): room air        PHYSICAL EXAM:  VITALS: Vital Signs Last 24 Hrs  T(C): 36.7 (15 Gonzalo 2024 05:01), Max: 37 (14 Jan 2024 19:32)  T(F): 98.1 (15 Gonzalo 2024 05:01), Max: 98.6 (14 Jan 2024 19:32)  HR: 85 (15 Gonzalo 2024 05:01) (73 - 94)  BP: 118/65 (15 Gonzalo 2024 05:01) (112/72 - 131/86)  BP(mean): --  RR: 15 (15 Gonzalo 2024 05:01) (15 - 20)  SpO2: 98% (15 Gonzalo 2024 05:01) (97% - 100%)    Parameters below as of 15 Gonzalo 2024 05:01  Patient On (Oxygen Delivery Method): room air      Gen: AOx3, NAD, non-toxic, pleasant  HEAD:  Atraumatic, Normocephalic  EYES: PERRLA, conjunctiva and sclera clear  ENT: Moist mucous membranes  NECK: Supple, No JVD  CV: S1+S2 normal, no murmurs   Resp: Clear bilat, no resp distress, no crackles/wheezes  Abd: Soft, nontender, +BS  Ext: No LE edema, no cyanosis, LE pulses present  : No wynne  IV/Skin: No thrombophlebitis, no skin rash  Msk: No arthralgias, no joint swelling  Neuro: AAOx3. No sensory deficits, no motor deficits                          11.2   7.41  )-----------( 275      ( 15 Gonzalo 2024 05:07 )             35.4     01-15    140  |  111<H>  |  15  ----------------------------<  94  4.3   |  26  |  0.86    Ca    9.2      15 Gonzalo 2024 05:07                CAPILLARY BLOOD GLUCOSE      RADIOLOGY & ADDITIONAL TESTS:                   PGY-1 Progress Note discussed with attending    PAGER #: [[phone]] TILL 5:00 PM  PLEASE CONTACT ON CALL TEAM:  - On Call Team (Please refer to Albino) FROM 5:00 PM - 8:30PM  - Nightfloat Team FROM 8:30 -7:30 AM    CHIEF COMPLAINT & BRIEF HOSPITAL COURSE:    INTERVAL HPI/OVERNIGHT EVENTS:   MEDICATIONS  (STANDING):  acetaminophen     Tablet .. 1000 milliGRAM(s) Oral every 6 hours  cyclobenzaprine 10 milliGRAM(s) Oral three times a day  gabapentin 300 milliGRAM(s) Oral three times a day  ketorolac   Injectable 30 milliGRAM(s) IV Push every 6 hours  lidocaine   4% Patch 1 Patch Transdermal daily  pantoprazole    Tablet 40 milliGRAM(s) Oral before breakfast  polyethylene glycol 3350 17 Gram(s) Oral daily  senna 2 Tablet(s) Oral at bedtime    MEDICATIONS  (PRN):  ondansetron Injectable 4 milliGRAM(s) IV Push every 8 hours PRN Nausea and/or Vomiting  oxyCODONE    IR 5 milliGRAM(s) Oral every 4 hours PRN Severe Pain (7 - 10)      REVIEW OF SYSTEMS:  CONSTITUTIONAL:  No fevers or chills, good appetite, good general state  EYES/ENT:  No visual changes;  No vertigo or throat pain   NECK:  No neck pain or stiffness  RESPIRATORY:  No cough, wheezing, hemoptysis; No shortness of breath  CARDIOVASCULAR:  No chest pain or palpitations  GASTROINTESTINAL:  No abdominal pain. No nausea, vomiting, or hematemesis; No diarrhea or constipation. No melena or hematochezia.  GENITOURINARY:  No dysuria, frequency or hematuria  NEUROLOGICAL:  +severe lower back pain radiating down inner and outer thighs   MSK: no back pain, no joint pain  SKIN:  No itching, no skin rash    Vital Signs Last 24 Hrs  T(C): 36.7 (15 Gonzalo 2024 05:01), Max: 37 (14 Jan 2024 19:32)  T(F): 98.1 (15 Gonzalo 2024 05:01), Max: 98.6 (14 Jan 2024 19:32)  HR: 85 (15 Gonzalo 2024 05:01) (73 - 94)  BP: 118/65 (15 Gonzalo 2024 05:01) (112/72 - 131/86)  BP(mean): --  RR: 15 (15 Gonzalo 2024 05:01) (15 - 20)  SpO2: 98% (15 Gonzalo 2024 05:01) (97% - 100%)    Parameters below as of 15 Gonzalo 2024 05:01  Patient On (Oxygen Delivery Method): room air        PHYSICAL EXAM:  VITALS: Vital Signs Last 24 Hrs  T(C): 36.7 (15 Gonzalo 2024 05:01), Max: 37 (14 Jan 2024 19:32)  T(F): 98.1 (15 Gonzalo 2024 05:01), Max: 98.6 (14 Jan 2024 19:32)  HR: 85 (15 Gonzalo 2024 05:01) (73 - 94)  BP: 118/65 (15 Gonzalo 2024 05:01) (112/72 - 131/86)  BP(mean): --  RR: 15 (15 Gonzalo 2024 05:01) (15 - 20)  SpO2: 98% (15 Gonzalo 2024 05:01) (97% - 100%)    Parameters below as of 15 Gonzalo 2024 05:01  Patient On (Oxygen Delivery Method): room air      Gen: AOx3, NAD, non-toxic, pleasant  HEAD:  Atraumatic, Normocephalic  EYES: PERRLA, conjunctiva and sclera clear  ENT: Moist mucous membranes  NECK: Supple, No JVD  CV: S1+S2 normal, no murmurs   Resp: Clear bilat, no resp distress, no crackles/wheezes  Abd: Soft, nontender, +BS  Ext: No LE edema, no cyanosis, LE pulses present  : No wynne  IV/Skin: No thrombophlebitis, no skin rash  Msk: No arthralgias, no joint swelling  Neuro: AAOx3. No sensory deficits, no motor deficits                          11.2   7.41  )-----------( 275      ( 15 Gonzalo 2024 05:07 )             35.4     01-15    140  |  111<H>  |  15  ----------------------------<  94  4.3   |  26  |  0.86    Ca    9.2      15 Gonzalo 2024 05:07                CAPILLARY BLOOD GLUCOSE      RADIOLOGY & ADDITIONAL TESTS:

## 2024-01-15 NOTE — PATIENT PROFILE ADULT - CAREGIVER ADDRESS
[address] [place] [id number] [address] [place] [id number] [address] [place] [id number]

## 2024-01-15 NOTE — PHYSICAL THERAPY INITIAL EVALUATION ADULT - DIAGNOSIS, PT EVAL
Pt. present w/deficits in  Body Structures/Function including strength, balance, pain (LBP radiating to thighs) leading to deficits in performing bed mobility, transfer and ambulation endurance.

## 2024-01-15 NOTE — PROGRESS NOTE ADULT - PROBLEM SELECTOR PLAN 1
Pw lower back pain, radiating to lower ext b/l  PE - pain upon hip flexion, no pain on straight leg raise, no signs of compression    Pain mgmt (started 1/15)  Oxycodone 5 mg PO q 4 hours PRN severe pain. Monitor for sedation/ respiratory depression.   Flexeril 10mg PO TID x 3 days.   Toradol 30mg IVP q 6 hours x 3 days with PPI.   Acetaminophen 1 gram PO q 6 hours x 4 days. Monitor LFTs  Gabapentin 300mg po q 8 hours. Monitor renal function.   Lidoderm 4% patch daily.     Neuro:   f/u MRI head, spine   f/u Neuromyelitis Optica antibody level and Myelin Associated Glycoprotein antibody

## 2024-01-15 NOTE — CONSULT NOTE ADULT - ASSESSMENT
Confidential Drug Utilization Report  Search Terms: Senait Roque, 1990Search Date: 01/15/2024 10:23:14 AM  The Drug Utilization Report below displays all of the controlled substance prescriptions, if any, that your patient has filled in the last twelve months. The information displayed on this report is compiled from pharmacy submissions to the Department, and accurately reflects the information as submitted by the pharmacies.    This report was requested by: Camille Bergeron | Reference #: [id number]    You have not added a ARMINDA number. Keeping your ARMINDA number(s) up to date on the My ARMINDA # tab will enable the separation of your prescriptions from others in the search results.    Practitioner Count: 0  Pharmacy Count: 0  Current Opioid Prescriptions: 0  Current Benzodiazepine Prescriptions: 0  Current Stimulant Prescriptions: 0      Patient Demographic Information (PDI)       PDI	First Name	Last Name	Birth Date	Gender	Street Address	[facility]	Zip Code  A	Senait Roque	1990	Female	[address] [facility]	[id number]    Prescription Information      PDI Filter:    PDI	Current Rx	Drug Type	Rx Written	Rx Dispensed	Drug	Quantity	Days Supply	Prescriber Name	Prescriber ARMINDA #	Payment Method	Dispenser  A	N	B	05/10/2023	05/17/2023	alprazolam 0.5 mg tablet	60	30	Jozef Sutherland	[id number]	Medicaid	[facility]    * - Details of Drug Type : O = Opioid, B = Benzodiazepine, S = Stimulant    * - Drugs marked with an asterisk are compound drugs. If the compound drug is made up of more than one controlled substance, then each controlled substance will be a separate row in the table. Confidential Drug Utilization Report  Search Terms: Senait Roque, 1990Search Date: 01/15/2024 10:23:14 AM  The Drug Utilization Report below displays all of the controlled substance prescriptions, if any, that your patient has filled in the last twelve months. The information displayed on this report is compiled from pharmacy submissions to the Department, and accurately reflects the information as submitted by the pharmacies.    This report was requested by: Camille Bergeron | Reference #: [id number]    You have not added a ARMINDA number. Keeping your ARMINDA number(s) up to date on the My ARMINDA # tab will enable the separation of your prescriptions from others in the search results.    Practitioner Count: 0  Pharmacy Count: 0  Current Opioid Prescriptions: 0  Current Benzodiazepine Prescriptions: 0  Current Stimulant Prescriptions: 0      Patient Demographic Information (PDI)       PDI	First Name	Last Name	Birth Date	Gender	Street Address	[facility]	Zip Code  A	Senait Roque	1990	Female	[address] [facility]	[id number]    Prescription Information      PDI Filter:    PDI	Current Rx	Drug Type	Rx Written	Rx Dispensed	Drug	Quantity	Days Supply	Prescriber Name	Prescriber ARMINDA #	Payment Method	Dispenser  A	N	B	05/10/2023	05/17/2023	alprazolam 0.5 mg tablet	60	30	Jozef Sutherland	[id number]	Medicaid	[facility]    * - Details of Drug Type : O = Opioid, B = Benzodiazepine, S = Stimulant    * - Drugs marked with an asterisk are compound drugs. If the compound drug is made up of more than one controlled substance, then each controlled substance will be a separate row in the table. Confidential Drug Utilization Report  Search Terms: Senait Roque, 1990Search Date: 01/15/2024 10:23:14 AM  The Drug Utilization Report below displays all of the controlled substance prescriptions, if any, that your patient has filled in the last twelve months. The information displayed on this report is compiled from pharmacy submissions to the Department, and accurately reflects the information as submitted by the pharmacies.    This report was requested by: Camille Bergeron | Reference #: [id number]    You have not added a ARMINDA number. Keeping your ARMINDA number(s) up to date on the My ARMINDA # tab will enable the separation of your prescriptions from others in the search results.    Practitioner Count: 0  Pharmacy Count: 0  Current Opioid Prescriptions: 0  Current Benzodiazepine Prescriptions: 0  Current Stimulant Prescriptions: 0      Patient Demographic Information (PDI)       PDI	First Name	Last Name	Birth Date	Gender	Street Address	[facility]	Zip Code  A	Senait Roque	1990	Female	[address] [facility]	[id number]    Prescription Information      PDI Filter:    PDI	Current Rx	Drug Type	Rx Written	Rx Dispensed	Drug	Quantity	Days Supply	Prescriber Name	Prescriber ARMINDA #	Payment Method	Dispenser  A	N	B	05/10/2023	05/17/2023	alprazolam 0.5 mg tablet	60	30	Jozef Sutherland	[id number]	Medicaid	[facility]    * - Details of Drug Type : O = Opioid, B = Benzodiazepine, S = Stimulant    * - Drugs marked with an asterisk are compound drugs. If the compound drug is made up of more than one controlled substance, then each controlled substance will be a separate row in the table.

## 2024-01-15 NOTE — PHYSICAL THERAPY INITIAL EVALUATION ADULT - GENERAL OBSERVATIONS, REHAB EVAL
Patient was seen for PT evaluation today. EMR, laboratory and radiology results reviewed. Pt received supine in bed, c/o pain with painscale of 9/10 and family member in room.

## 2024-01-15 NOTE — PROGRESS NOTE ADULT - ASSESSMENT
33 y.o. F PMHx of morbid obesity presents to the ED today with lower back pain. Admitted to medicine for pain management. Per neurology, r/o demyelinating spine and optic nerve disorder.

## 2024-01-15 NOTE — PATIENT PROFILE ADULT - FALL HARM RISK - HARM RISK INTERVENTIONS
Assistance OOB with selected safe patient handling equipment/Communicate Risk of Fall with Harm to all staff/Monitor gait and stability/Reinforce activity limits and safety measures with patient and family/Tailored Fall Risk Interventions/Visual Cue: Yellow wristband and red socks/Bed in lowest position, wheels locked, appropriate side rails in place/Call bell, personal items and telephone in reach/Instruct patient to call for assistance before getting out of bed or chair/Non-slip footwear when patient is out of bed/[place] to call system/Physically safe environment - no spills, clutter or unnecessary equipment/Purposeful Proactive Rounding/Room/bathroom lighting operational, light cord in reach Assistance OOB with selected safe patient handling equipment/Communicate Risk of Fall with Harm to all staff/Monitor gait and stability/Reinforce activity limits and safety measures with patient and family/Tailored Fall Risk Interventions/Visual Cue: Yellow wristband and red socks/Bed in lowest position, wheels locked, appropriate side rails in place/Call bell, personal items and telephone in reach/Instruct patient to call for assistance before getting out of bed or chair/Non-slip footwear when patient is out of bed/[place] to call system/Physically safe environment - no spills, clutter or unnecessary equipment/Purposeful Proactive Rounding/Room/bathroom lighting operational, light cord in reach Assistance OOB with selected safe patient handling equipment/Communicate Risk of Fall with Harm to all staff/Monitor gait and stability/Reinforce activity limits and safety measures with patient and family/Tailored Fall Risk Interventions/Visual Cue: Yellow wristband and red socks/Bed in lowest position, wheels locked, appropriate side rails in place/Call bell, personal items and telephone in reach/Instruct patient to call for assistance before getting out of bed or chair/Non-slip footwear when patient is out of bed/[place] to call system/Physically safe environment - no spills, clutter or unnecessary equipment/Purposeful Proactive Rounding/Room/bathroom lighting operational, light cord in reach

## 2024-01-15 NOTE — PATIENT PROFILE ADULT - NSTRANSFERBELONGINGSDISPO_GEN_A_NUR
Wallet given to  upon arrival to unit. Belongings documented on property sheet./given to family/with patient

## 2024-01-15 NOTE — CONSULT NOTE ADULT - SUBJECTIVE AND OBJECTIVE BOX
Source of information: [employer]LEEN GROSS, Chart review  Patient language: English  : n/a    HPI:  33 y.o. F PMHx of morbid obesity presents to the ED today with lower back pain.  Patient reports yesterday she suddenly had sharp pain and stiffness in her lower back rating down both legs.  Patient states that she has no comfortable position when she sitting or standing she has pain.  Denies any numbness, tingling, sensory losses, incontinence or inability to move lower extremities. States she did not take anything for the pain. Otherwise pt denies any chest pain, palpitations, shortness of breath, fever, chills, headache, visual changes, nausea, vomiting diarrhea. NKDA   (14 Jan 2024 19:08)    Pt is admitted for dorsalgia. LS xray demonstrates- Mild degenerative disc disease. Pain consulted 1/15. Pt seen and examined at bedside. Pt laying in bed, reports lumbar back pain started on 1/13. Denies hx heavy lifting, injury, falls. Denies hx back pain in the past. Reports pain progressively worsened on 1/13 for which she took Tylenol Motrin and Bengay at home with minimal relief. On 1/14 pt was having difficulty getting out of bed due to her back pain. Denies bladder/ bowel incontinence, saddle anesthesia. Reports occasional numbness/ tingling of b/l hands which occurs when laying on arm. Currently reports lumbar back pain score 10/10  SCALE USED: (1-10 VNRS). Pt describes pain as constant, radiating to b/l legs, greatest over left lumbar back, minimally alleviated by pain medication, exacerbated by movement, turning, and elevating legs. Pt tolerating PO diet. Denies chest pain, SOB, nausea, vomiting, constipation. Reports experiencing nausea in ED after given pain medication. Denies nausea at this time. Reports last BM 1/14. Patient stated goal for pain control: to be able to take deep breaths, get out of bed to chair and ambulate with tolerable pain control. Pt able to take a couple steps however with increased pain.     PAST MEDICAL & SURGICAL HISTORY:      FAMILY HISTORY:      Social History:  Pt lives at home with family, ambulates independently, denies any tobacco, alcohol, drug use. (14 Jan 2024 19:08)   [ X] Denies ETOH use, illicit drug use and smoking    Allergies    cefazolin (Hives)    MEDICATIONS  (STANDING):  acetaminophen     Tablet .. 1000 milliGRAM(s) Oral every 6 hours  cyclobenzaprine 10 milliGRAM(s) Oral three times a day  gabapentin 300 milliGRAM(s) Oral three times a day  ketorolac   Injectable 30 milliGRAM(s) IV Push every 6 hours  lidocaine   4% Patch 1 Patch Transdermal daily  pantoprazole    Tablet 40 milliGRAM(s) Oral before breakfast  polyethylene glycol 3350 17 Gram(s) Oral daily  senna 2 Tablet(s) Oral at bedtime    MEDICATIONS  (PRN):  ondansetron Injectable 4 milliGRAM(s) IV Push every 8 hours PRN Nausea and/or Vomiting  oxyCODONE    IR 5 milliGRAM(s) Oral every 4 hours PRN Severe Pain (7 - 10)      Vital Signs Last 24 Hrs  T(C): 36.7 (15 Gonzalo 2024 05:01), Max: 37 (14 Jan 2024 19:32)  T(F): 98.1 (15 Gonzalo 2024 05:01), Max: 98.6 (14 Jan 2024 19:32)  HR: 85 (15 Gonzalo 2024 05:01) (73 - 94)  BP: 118/65 (15 Gonzalo 2024 05:01) (112/72 - 131/86)  BP(mean): --  RR: 15 (15 Gonzalo 2024 05:01) (15 - 20)  SpO2: 98% (15 Gonzalo 2024 05:01) (97% - 100%)    Parameters below as of 15 Gonzalo 2024 05:01  Patient On (Oxygen Delivery Method): room air        LABS: Reviewed.                          11.2   7.41  )-----------( 275      ( 15 Gonzalo 2024 05:07 )             35.4     01-15    140  |  111<H>  |  15  ----------------------------<  94  4.3   |  26  |  0.86    Ca    9.2      15 Gonzalo 2024 05:07          Urinalysis Basic - ( 15 Gonzalo 2024 05:07 )    Color: x / Appearance: x / SG: x / pH: x  Gluc: 94 mg/dL / Ketone: x  / Bili: x / Urobili: x   Blood: x / Protein: x / Nitrite: x   Leuk Esterase: x / RBC: x / WBC x   Sq Epi: x / Non Sq Epi: x / Bacteria: x      Radiology: Reviewed.   < from: Xray Lumbosacral Spine (01.14.24 @ 18:03) >    ACC: [id number] EXAM:  XR LS SPINE MIN 4 VIEWS   ORDERED BY: CHARISSE BE     PROCEDURE DATE:  01/14/2024          INTERPRETATION:  CLINICAL HISTORY: Pain.    Lumbosacral Spine.    Frontal, lateral and cone-down projections demonstrate satisfactory  alignment of the bony structures. There is a mild, rotatory,   thoracolumbar scoliosis convexity to the patient's left. No fracture or   vertebral compression can be appreciated. There is no evidence of pedicle   destruction. There is mild disc space narrowing at the L3-4, L4-5 and   L5-S1 levels. The sacroiliac joints are symmetric.    IMPRESSION: Mild degenerative disc disease. If clinically indicated,   further assessment with MRI is recommended.    --- End of Report ---            AMINTA LORENZANA MD; Attending Radiologist  This document has been electronically signed. Gonzalo 15 2024  9:57AM    < end of copied text >      ORT Score -   Family Hx of substance abuse	Female	      Male  Alcohol 	                                           1                     3  Illegal drugs	                                   2                     3  Rx drugs                                           4 	                  4  Personal Hx of substance abuse		  Alcohol 	                                          3	                  3  Illegal drugs                                     4	                  4  Rx drugs                                            5 	                  5  Age between 16- 45 years	           1                     1  hx preadolescent sexual abuse	   3 	                  0  Psychological disease		  ADD, OCD, bipolar, schizophrenia   2	          2  Depression                                           1 	          1  Total: 1    a score of 3 or lower indicates low risk for opioid abuse		  a score of 4-7 indicates moderate risk for opioid abuse		  a score of 8 or higher indicates high risk for opioid abuse    REVIEW OF SYSTEMS:  CONSTITUTIONAL: No fever or fatigue  HEENT:  No difficulty hearing, no change in vision  NECK: No pain or stiffness  RESPIRATORY: No cough, wheezing, chills or hemoptysis; No shortness of breath  CARDIOVASCULAR: No chest pain, palpitations, dizziness, or leg swelling  GASTROINTESTINAL: No loss of appetite, decreased PO intake. No abdominal or epigastric pain. No nausea, vomiting; No diarrhea or constipation.   GENITOURINARY: No dysuria, frequency, hematuria, retention or incontinence  MUSCULOSKELETAL: + lumbar back pain radiating to b/l legs; No joint swelling; no upper or lower motor strength weakness, no saddle anesthesia, bowel/bladder incontinence, no falls   NEURO: No headaches, + occasional numbness/tingling b/l hand (upon pressure), No weakness    PHYSICAL EXAM:  GENERAL:  Alert & Oriented X4, cooperative, NAD, Good concentration. Speech is clear.   RESPIRATORY: Respirations even and unlabored. Clear to auscultation bilaterally; No rales, rhonchi, wheezing, or rubs  CARDIOVASCULAR: Normal S1/S2, regular rate and rhythm; No murmurs, rubs, or gallops. No JVD.   GASTROINTESTINAL: + morbid obesity per BMI, Soft, Nontender, Nondistended; Bowel sounds present  PERIPHERAL VASCULAR:  Extremities warm without edema. 2+ Peripheral Pulses, No cyanosis, No calf tenderness  MUSCULOSKELETAL: Motor Strength 5/5 B/L upper and lower extremities; moves all extremities equally against gravity; ROM intact; +SLR bilaterally at 20 degrees; + lumbar back tenderness on palpation left lumbar > right lumbar.   SKIN: Warm, dry, intact. No rashes, lesions, scars or wounds.     Risk factors associated with adverse outcomes related to opioid treatment  [ ]  Concurrent benzodiazepine use  [ ]  History/ Active substance use or alcohol use disorder  [ ] Psychiatric co-morbidity  [ ] Sleep apnea  [ ] COPD  [X ] BMI> 35  [ ] Liver dysfunction  [ ] Renal dysfunction  [ ] CHF  [ ] Smoker  [ ]  Age > 60 years    [X ]  [place]  Reviewed and Copied to Chart. See below.    Plan of care and goal oriented pain management treatment options were discussed with patient and /or primary care giver; all questions and concerns were addressed and care was aligned with patient's wishes.    Educated patient on goal oriented pain management treatment options        Source of information: [employer]LE[facility] [employer]OSS, Chart review  Patient language: English  : n/a    HPI:  33 y.o. F PMHx of morbid obesity presents to the ED today with lower back pain.  Patient reports yesterday she suddenly had sharp pain and stiffness in her lower back rating down both legs.  Patient states that she has no comfortable position when she sitting or standing she has pain.  Denies any numbness, tingling, sensory losses, incontinence or inability to move lower extremities. States she did not take anything for the pain. Otherwise pt denies any chest pain, palpitations, shortness of breath, fever, chills, headache, visual changes, nausea, vomiting diarrhea. NKDA   (14 Jan 2024 19:08)    Pt is admitted for dorsalgia. LS xray demonstrates- Mild degenerative disc disease. Pain consulted 1/15. Pt seen and examined at bedside. Pt laying in bed, reports lumbar back pain started on 1/13. Denies hx heavy lifting, injury, falls. Denies hx back pain in the past. Reports pain progressively worsened on 1/13 for which she took Tylenol Motrin and Bengay at home with minimal relief. On 1/14 pt was having difficulty getting out of bed due to her back pain. Denies bladder/ bowel incontinence, saddle anesthesia. Reports occasional numbness/ tingling of b/l hands which occurs when laying on arm. Currently reports lumbar back pain score 10/10  SCALE USED: (1-10 VNRS). Pt describes pain as constant, radiating to b/l legs, greatest over left lumbar back, minimally alleviated by pain medication, exacerbated by movement, turning, and elevating legs. Pt tolerating PO diet. Denies chest pain, SOB, nausea, vomiting, constipation. Reports experiencing nausea in ED after given pain medication. Denies nausea at this time. Reports last BM 1/14. Patient stated goal for pain control: to be able to take deep breaths, get out of bed to chair and ambulate with tolerable pain control. Pt able to take a couple steps however with increased pain.     PAST MEDICAL & SURGICAL HISTORY:      FAMILY HISTORY:      Social History:  Pt lives at home with family, ambulates independently, denies any tobacco, alcohol, drug use. (14 Jan 2024 19:08)   [ X] Denies ETOH use, illicit drug use and smoking    Allergies    cefazolin (Hives)    MEDICATIONS  (STANDING):  acetaminophen     Tablet .. 1000 milliGRAM(s) Oral every 6 hours  cyclobenzaprine 10 milliGRAM(s) Oral three times a day  gabapentin 300 milliGRAM(s) Oral three times a day  ketorolac   Injectable 30 milliGRAM(s) IV Push every 6 hours  lidocaine   4% Patch 1 Patch Transdermal daily  pantoprazole    Tablet 40 milliGRAM(s) Oral before breakfast  polyethylene glycol 3350 17 Gram(s) Oral daily  senna 2 Tablet(s) Oral at bedtime    MEDICATIONS  (PRN):  ondansetron Injectable 4 milliGRAM(s) IV Push every 8 hours PRN Nausea and/or Vomiting  oxyCODONE    IR 5 milliGRAM(s) Oral every 4 hours PRN Severe Pain (7 - 10)      Vital Signs Last 24 Hrs  T(C): 36.7 (15 Gonzalo 2024 05:01), Max: 37 (14 Jan 2024 19:32)  T(F): 98.1 (15 Gonzalo 2024 05:01), Max: 98.6 (14 Jan 2024 19:32)  HR: 85 (15 Gonzalo 2024 05:01) (73 - 94)  BP: 118/65 (15 Gonzalo 2024 05:01) (112/72 - 131/86)  BP(mean): --  RR: 15 (15 Gonzalo 2024 05:01) (15 - 20)  SpO2: 98% (15 Gonzalo 2024 05:01) (97% - 100%)    Parameters below as of 15 Gonzalo 2024 05:01  Patient On (Oxygen Delivery Method): room air        LABS: Reviewed.                          11.2   7.41  )-----------( 275      ( 15 Gonzalo 2024 05:07 )             35.4     01-15    140  |  111<H>  |  15  ----------------------------<  94  4.3   |  26  |  0.86    Ca    9.2      15 Gonzalo 2024 05:07          Urinalysis Basic - ( 15 Gonzalo 2024 05:07 )    Color: x / Appearance: x / SG: x / pH: x  Gluc: 94 mg/dL / Ketone: x  / Bili: x / Urobili: x   Blood: x / Protein: x / Nitrite: x   Leuk Esterase: x / RBC: x / WBC x   Sq Epi: x / Non Sq Epi: x / Bacteria: x      Radiology: Reviewed.   < from: Xray Lumbosacral Spine (01.14.24 @ 18:03) >    ACC: [id number] EXAM:  XR LS SPINE MIN 4 VIEWS   ORDERED BY: CHARISSE BE     PROCEDURE DATE:  01/14/2024          INTERPRETATION:  CLINICAL HISTORY: Pain.    Lumbosacral Spine.    Frontal, lateral and cone-down projections demonstrate satisfactory  alignment of the bony structures. There is a mild, rotatory,   thoracolumbar scoliosis convexity to the patient's left. No fracture or   vertebral compression can be appreciated. There is no evidence of pedicle   destruction. There is mild disc space narrowing at the L3-4, L4-5 and   L5-S1 levels. The sacroiliac joints are symmetric.    IMPRESSION: Mild degenerative disc disease. If clinically indicated,   further assessment with MRI is recommended.    --- End of Report ---            AMINTA LORENZANA MD; Attending Radiologist  This document has been electronically signed. Gonzalo 15 2024  9:57AM    < end of copied text >      ORT Score -   Family Hx of substance abuse	Female	      Male  Alcohol 	                                           1                     3  Illegal drugs	                                   2                     3  Rx drugs                                           4 	                  4  Personal Hx of substance abuse		  Alcohol 	                                          3	                  3  Illegal drugs                                     4	                  4  Rx drugs                                            5 	                  5  Age between 16- 45 years	           1                     1  hx preadolescent sexual abuse	   3 	                  0  Psychological disease		  ADD, OCD, bipolar, schizophrenia   2	          2  Depression                                           1 	          1  Total: 1    a score of 3 or lower indicates low risk for opioid abuse		  a score of 4-7 indicates moderate risk for opioid abuse		  a score of 8 or higher indicates high risk for opioid abuse    REVIEW OF SYSTEMS:  CONSTITUTIONAL: No fever or fatigue  HEENT:  No difficulty hearing, no change in vision  NECK: No pain or stiffness  RESPIRATORY: No cough, wheezing, chills or hemoptysis; No shortness of breath  CARDIOVASCULAR: No chest pain, palpitations, dizziness, or leg swelling  GASTROINTESTINAL: No loss of appetite, decreased PO intake. No abdominal or epigastric pain. No nausea, vomiting; No diarrhea or constipation.   GENITOURINARY: No dysuria, frequency, hematuria, retention or incontinence  MUSCULOSKELETAL: + lumbar back pain radiating to b/l legs; No joint swelling; no upper or lower motor strength weakness, no saddle anesthesia, bowel/bladder incontinence, no falls   NEURO: No headaches, + occasional numbness/tingling b/l hand (upon pressure), No weakness    PHYSICAL EXAM:  GENERAL:  Alert & Oriented X4, cooperative, NAD, Good concentration. Speech is clear.   RESPIRATORY: Respirations even and unlabored. Clear to auscultation bilaterally; No rales, rhonchi, wheezing, or rubs  CARDIOVASCULAR: Normal S1/S2, regular rate and rhythm; No murmurs, rubs, or gallops. No JVD.   GASTROINTESTINAL: + morbid obesity per BMI, Soft, Nontender, Nondistended; Bowel sounds present  PERIPHERAL VASCULAR:  Extremities warm without edema. 2+ Peripheral Pulses, No cyanosis, No calf tenderness  MUSCULOSKELETAL: Motor Strength 5/5 B/L upper and lower extremities; moves all extremities equally against gravity; ROM intact; +SLR bilaterally at 20 degrees; + lumbar back tenderness on palpation left lumbar > right lumbar.   SKIN: Warm, dry, intact. No rashes, lesions, scars or wounds.     Risk factors associated with adverse outcomes related to opioid treatment  [ ]  Concurrent benzodiazepine use  [ ]  History/ Active substance use or alcohol use disorder  [ ] Psychiatric co-morbidity  [ ] Sleep apnea  [ ] COPD  [X ] BMI> 35  [ ] Liver dysfunction  [ ] Renal dysfunction  [ ] CHF  [ ] Smoker  [ ]  Age > 60 years    [X ]  [place]  Reviewed and Copied to Chart. See below.    Plan of care and goal oriented pain management treatment options were discussed with patient and /or primary care giver; all questions and concerns were addressed and care was aligned with patient's wishes.    Educated patient on goal oriented pain management treatment options        Source of information: [employer]LEEN GROSS, Chart review  Patient language: English  : n/a    HPI:  33 y.o. F PMHx of morbid obesity presents to the ED today with lower back pain.  Patient reports yesterday she suddenly had sharp pain and stiffness in her lower back rating down both legs.  Patient states that she has no comfortable position when she sitting or standing she has pain.  Denies any numbness, tingling, sensory losses, incontinence or inability to move lower extremities. States she did not take anything for the pain. Otherwise pt denies any chest pain, palpitations, shortness of breath, fever, chills, headache, visual changes, nausea, vomiting diarrhea. NKDA   (14 Jan 2024 19:08)    Pt is admitted for dorsalgia. LS xray demonstrates- Mild degenerative disc disease. Pain consulted 1/15. Pt seen and examined at bedside. Pt laying in bed, reports lumbar back pain started on 1/13. Denies hx heavy lifting, injury, falls. Denies hx back pain in the past. Reports pain progressively worsened on 1/13 for which she took Tylenol Motrin and Bengay at home with minimal relief. On 1/14 pt was having difficulty getting out of bed due to her back pain. Denies bladder/ bowel incontinence, saddle anesthesia. Reports occasional numbness/ tingling of b/l hands which occurs when laying on arm. Currently reports lumbar back pain score 10/10  SCALE USED: (1-10 VNRS). Pt describes pain as constant, radiating to b/l legs, greatest over left lumbar back, minimally alleviated by pain medication, exacerbated by movement, turning, and elevating legs. Pt tolerating PO diet. Denies chest pain, SOB, nausea, vomiting, constipation. Reports experiencing nausea in ED after given pain medication. Denies nausea at this time. Reports last BM 1/14. Patient stated goal for pain control: to be able to take deep breaths, get out of bed to chair and ambulate with tolerable pain control. Pt able to take a couple steps however with increased pain.     PAST MEDICAL & SURGICAL HISTORY:      FAMILY HISTORY:      Social History:  Pt lives at home with family, ambulates independently, denies any tobacco, alcohol, drug use. (14 Jan 2024 19:08)   [ X] Denies ETOH use, illicit drug use and smoking    Allergies    cefazolin (Hives)    MEDICATIONS  (STANDING):  acetaminophen     Tablet .. 1000 milliGRAM(s) Oral every 6 hours  cyclobenzaprine 10 milliGRAM(s) Oral three times a day  gabapentin 300 milliGRAM(s) Oral three times a day  ketorolac   Injectable 30 milliGRAM(s) IV Push every 6 hours  lidocaine   4% Patch 1 Patch Transdermal daily  pantoprazole    Tablet 40 milliGRAM(s) Oral before breakfast  polyethylene glycol 3350 17 Gram(s) Oral daily  senna 2 Tablet(s) Oral at bedtime    MEDICATIONS  (PRN):  ondansetron Injectable 4 milliGRAM(s) IV Push every 8 hours PRN Nausea and/or Vomiting  oxyCODONE    IR 5 milliGRAM(s) Oral every 4 hours PRN Severe Pain (7 - 10)      Vital Signs Last 24 Hrs  T(C): 36.7 (15 Gonzalo 2024 05:01), Max: 37 (14 Jan 2024 19:32)  T(F): 98.1 (15 Gonzalo 2024 05:01), Max: 98.6 (14 Jan 2024 19:32)  HR: 85 (15 Gonzalo 2024 05:01) (73 - 94)  BP: 118/65 (15 Gonzalo 2024 05:01) (112/72 - 131/86)  BP(mean): --  RR: 15 (15 Gonzalo 2024 05:01) (15 - 20)  SpO2: 98% (15 Gonzalo 2024 05:01) (97% - 100%)    Parameters below as of 15 Gonzalo 2024 05:01  Patient On (Oxygen Delivery Method): room air        LABS: Reviewed.                          11.2   7.41  )-----------( 275      ( 15 Gonzalo 2024 05:07 )             35.4     01-15    140  |  111<H>  |  15  ----------------------------<  94  4.3   |  26  |  0.86    Ca    9.2      15 Gonzalo 2024 05:07          Urinalysis Basic - ( 15 Gonzalo 2024 05:07 )    Color: x / Appearance: x / SG: x / pH: x  Gluc: 94 mg/dL / Ketone: x  / Bili: x / Urobili: x   Blood: x / Protein: x / Nitrite: x   Leuk Esterase: x / RBC: x / WBC x   Sq Epi: x / Non Sq Epi: x / Bacteria: x      Radiology: Reviewed.   < from: Xray Lumbosacral Spine (01.14.24 @ 18:03) >    ACC: [id number] EXAM:  XR LS SPINE MIN 4 VIEWS   ORDERED BY: CHARISSE BE     PROCEDURE DATE:  01/14/2024          INTERPRETATION:  CLINICAL HISTORY: Pain.    Lumbosacral Spine.    Frontal, lateral and cone-down projections demonstrate satisfactory  alignment of the bony structures. There is a mild, rotatory,   thoracolumbar scoliosis convexity to the patient's left. No fracture or   vertebral compression can be appreciated. There is no evidence of pedicle   destruction. There is mild disc space narrowing at the L3-4, L4-5 and   L5-S1 levels. The sacroiliac joints are symmetric.    IMPRESSION: Mild degenerative disc disease. If clinically indicated,   further assessment with MRI is recommended.    --- End of Report ---            AMINTA LORENZANA MD; Attending Radiologist  This document has been electronically signed. Gonzalo 15 2024  9:57AM    < end of copied text >      ORT Score -   Family Hx of substance abuse	Female	      Male  Alcohol 	                                           1                     3  Illegal drugs	                                   2                     3  Rx drugs                                           4 	                  4  Personal Hx of substance abuse		  Alcohol 	                                          3	                  3  Illegal drugs                                     4	                  4  Rx drugs                                            5 	                  5  Age between 16- 45 years	           1                     1  hx preadolescent sexual abuse	   3 	                  0  Psychological disease		  ADD, OCD, bipolar, schizophrenia   2	          2  Depression                                           1 	          1  Total: 1    a score of 3 or lower indicates low risk for opioid abuse		  a score of 4-7 indicates moderate risk for opioid abuse		  a score of 8 or higher indicates high risk for opioid abuse    REVIEW OF SYSTEMS:  CONSTITUTIONAL: No fever or fatigue  HEENT:  No difficulty hearing, no change in vision  NECK: No pain or stiffness  RESPIRATORY: No cough, wheezing, chills or hemoptysis; No shortness of breath  CARDIOVASCULAR: No chest pain, palpitations, dizziness, or leg swelling  GASTROINTESTINAL: No loss of appetite, decreased PO intake. No abdominal or epigastric pain. No nausea, vomiting; No diarrhea or constipation.   GENITOURINARY: No dysuria, frequency, hematuria, retention or incontinence  MUSCULOSKELETAL: + lumbar back pain radiating to b/l legs; No joint swelling; no upper or lower motor strength weakness, no saddle anesthesia, bowel/bladder incontinence, no falls   NEURO: No headaches, + occasional numbness/tingling b/l hand (upon pressure), No weakness    PHYSICAL EXAM:  GENERAL:  Alert & Oriented X4, cooperative, NAD, Good concentration. Speech is clear.   RESPIRATORY: Respirations even and unlabored. Clear to auscultation bilaterally; No rales, rhonchi, wheezing, or rubs  CARDIOVASCULAR: Normal S1/S2, regular rate and rhythm; No murmurs, rubs, or gallops. No JVD.   GASTROINTESTINAL: + morbid obesity per BMI, Soft, Nontender, Nondistended; Bowel sounds present  PERIPHERAL VASCULAR:  Extremities warm without edema. 2+ Peripheral Pulses, No cyanosis, No calf tenderness  MUSCULOSKELETAL: Motor Strength 5/5 B/L upper and lower extremities; moves all extremities equally against gravity; ROM intact; +SLR bilaterally at 20 degrees; + lumbar back tenderness on palpation left lumbar > right lumbar.   SKIN: Warm, dry, intact. No rashes, lesions, scars or wounds.     Risk factors associated with adverse outcomes related to opioid treatment  [ ]  Concurrent benzodiazepine use  [ ]  History/ Active substance use or alcohol use disorder  [ ] Psychiatric co-morbidity  [ ] Sleep apnea  [ ] COPD  [X ] BMI> 35  [ ] Liver dysfunction  [ ] Renal dysfunction  [ ] CHF  [ ] Smoker  [ ]  Age > 60 years    [X ]  [place]  Reviewed and Copied to Chart. See below.    Plan of care and goal oriented pain management treatment options were discussed with patient and /or primary care giver; all questions and concerns were addressed and care was aligned with patient's wishes.    Educated patient on goal oriented pain management treatment options

## 2024-01-15 NOTE — CONSULT NOTE ADULT - PROBLEM SELECTOR RECOMMENDATION 9
Pt with acute lumbar back pain radiating to b/l legs which is somatic and neuropathic in nature. Recommend CT lumbar spine.    Opioid pain recommendations   - Oxycodone 5 mg PO q 4 hours PRN severe pain. Monitor for sedation/ respiratory depression.   Non-opioid pain recommendations   - Flexeril 10mg PO TID x 3 days.   - Toradol 30mg IVP q 6 hours x 3 days with PPI.   - Acetaminophen 1 gram PO q 6 hours x 4 days. Monitor LFTs  - Gabapentin 300mg po q 8 hours. Monitor renal function.   - Lidoderm 4% patch daily.   Bowel Regimen  - Miralax 17G PO daily  - Senna 2 tablets at bedtime for constipation  Mild pain (score 1-3)  - Non-pharmacological pain treatment recommendations  - Warm/ Cool packs PRN   - Repositioning extremity, elevation, imagery, relaxation, distraction.  - Physical therapy OOB if no contraindications   Recommendations discussed with primary team and RN

## 2024-01-16 ENCOUNTER — TRANSCRIPTION ENCOUNTER (OUTPATIENT)
Age: 34
End: 2024-01-16

## 2024-01-16 PROCEDURE — 99232 SBSQ HOSP IP/OBS MODERATE 35: CPT

## 2024-01-16 RX ORDER — KETOROLAC TROMETHAMINE 30 MG/ML
15 SYRINGE (ML) INJECTION EVERY 8 HOURS
Refills: 0 | Status: DISCONTINUED | OUTPATIENT
Start: 2024-01-16 | End: 2024-01-17

## 2024-01-16 RX ADMIN — SENNA PLUS 2 TABLET(S): 8.6 TABLET ORAL at 21:29

## 2024-01-16 RX ADMIN — Medication 1 ENEMA: at 17:58

## 2024-01-16 RX ADMIN — Medication 1000 MILLIGRAM(S): at 02:54

## 2024-01-16 RX ADMIN — LIDOCAINE 1 PATCH: 4 CREAM TOPICAL at 01:25

## 2024-01-16 RX ADMIN — Medication 15 MILLIGRAM(S): at 15:07

## 2024-01-16 RX ADMIN — Medication 1000 MILLIGRAM(S): at 03:50

## 2024-01-16 RX ADMIN — Medication 5 MILLIGRAM(S): at 15:09

## 2024-01-16 RX ADMIN — Medication 1000 MILLIGRAM(S): at 12:04

## 2024-01-16 RX ADMIN — CYCLOBENZAPRINE HYDROCHLORIDE 10 MILLIGRAM(S): 10 TABLET, FILM COATED ORAL at 05:32

## 2024-01-16 RX ADMIN — Medication 1000 MILLIGRAM(S): at 11:05

## 2024-01-16 RX ADMIN — GABAPENTIN 300 MILLIGRAM(S): 400 CAPSULE ORAL at 15:07

## 2024-01-16 RX ADMIN — ONDANSETRON 4 MILLIGRAM(S): 8 TABLET, FILM COATED ORAL at 15:07

## 2024-01-16 RX ADMIN — PANTOPRAZOLE SODIUM 40 MILLIGRAM(S): 20 TABLET, DELAYED RELEASE ORAL at 05:32

## 2024-01-16 RX ADMIN — GABAPENTIN 300 MILLIGRAM(S): 400 CAPSULE ORAL at 05:32

## 2024-01-16 RX ADMIN — CYCLOBENZAPRINE HYDROCHLORIDE 10 MILLIGRAM(S): 10 TABLET, FILM COATED ORAL at 15:07

## 2024-01-16 RX ADMIN — GABAPENTIN 300 MILLIGRAM(S): 400 CAPSULE ORAL at 21:29

## 2024-01-16 RX ADMIN — POLYETHYLENE GLYCOL 3350 17 GRAM(S): 17 POWDER, FOR SOLUTION ORAL at 11:05

## 2024-01-16 RX ADMIN — LIDOCAINE 1 PATCH: 4 CREAM TOPICAL at 11:04

## 2024-01-16 RX ADMIN — CYCLOBENZAPRINE HYDROCHLORIDE 10 MILLIGRAM(S): 10 TABLET, FILM COATED ORAL at 21:30

## 2024-01-16 RX ADMIN — ENOXAPARIN SODIUM 40 MILLIGRAM(S): 100 INJECTION SUBCUTANEOUS at 05:33

## 2024-01-16 RX ADMIN — LIDOCAINE 1 PATCH: 4 CREAM TOPICAL at 22:22

## 2024-01-16 RX ADMIN — Medication 15 MILLIGRAM(S): at 16:02

## 2024-01-16 NOTE — DISCHARGE NOTE PROVIDER - CARE PROVIDER_API CALL
Soco Brewer)  Neurology  [address] [facility], [address]nd [place] [address]  Phone: [phone]  Fax: [phone]  Established Patient  Follow Up Time: 2 weeks    Yamini Rodriguez  Cardiology  [address] [place] [address]  Phone: [phone]  Fax: [phone]  Established Patient  Follow Up Time: 2 weeks    Alton Martin  Pain Medicine  [address] [place] [address]  Phone: [phone]  Fax: [phone]  Follow Up Time: 2 weeks    Emily Luque  Phone: [phone]  Fax: (   )    -  Follow Up Time: 2 weeks   Soco Brewer)  Neurology  [address] [facility], [address]nd [place] [address]  Phone: [phone]  Fax: [phone]  Established Patient  Follow Up Time: 2 weeks    Yamini Rodriguez  Cardiology  [address] [place] [address]  Phone: [phone]  Fax: [phone]  Established Patient  Follow Up Time: 2 weeks    Alton Martin  Pain Medicine  [address] [place] [address]  Phone: [phone]  Fax: [phone]  Follow Up Time: 2 weeks    Emily Luque  Phone: [phone]  Fax: (   )    -  Follow Up Time: 2 weeks

## 2024-01-16 NOTE — PROGRESS NOTE ADULT - ATTENDING COMMENTS
Pt appearing with debilitating LBP, clinically indicative of acute radiculopathy incapacitating pt in daily tasks /ambulation. Pain management initiated a regimen. May benefit from starting flexeril. Neurology to follow in a.m. May consider outpt epidural should this not improve in time.    1/16/24 - Reporting overall improvement since admission. Continues on Flexeril. Able to ambulated to limited extend now. Possible d/c planning pending improved pain tomorrow or Thursday. Pt appearing with debilitating LBP, clinically indicative of acute radiculopathy incapacitating pt in daily tasks /ambulation. Pain management initiated a regimen. May benefit from starting flexeril. Neurology to follow in a.m. May consider outpt epidural should this not improve in time.    1/16/24 - Reporting overall improvement since admission. Continues on Flexeril. Able to ambulated to limited extend now. Possible d/c planning pending improved pain tomorrow or Thursday. Will f/u MRI of brain given concern for a demyelinating lesional involvement.

## 2024-01-16 NOTE — PROGRESS NOTE ADULT - SUBJECTIVE AND OBJECTIVE BOX
PGY-1 Progress Note discussed with attending    PAGER #: [[phone]] TILL 5:00 PM  PLEASE CONTACT ON CALL TEAM:  - On Call Team (Please refer to Albino) FROM 5:00 PM - 8:30PM  - Nightfloat Team FROM 8:30 -7:30 AM    CHIEF COMPLAINT & BRIEF HOSPITAL COURSE:    INTERVAL HPI/OVERNIGHT EVENTS:   MEDICATIONS  (STANDING):  acetaminophen     Tablet .. 1000 milliGRAM(s) Oral every 6 hours  cyclobenzaprine 10 milliGRAM(s) Oral three times a day  enoxaparin Injectable 40 milliGRAM(s) SubCutaneous every 12 hours  gabapentin 300 milliGRAM(s) Oral three times a day  ketorolac   Injectable 15 milliGRAM(s) IV Push every 8 hours  lidocaine   4% Patch 1 Patch Transdermal daily  pantoprazole    Tablet 40 milliGRAM(s) Oral before breakfast  polyethylene glycol 3350 17 Gram(s) Oral daily  senna 2 Tablet(s) Oral at bedtime    MEDICATIONS  (PRN):  bisacodyl 5 milliGRAM(s) Oral every 12 hours PRN Constipation  ondansetron Injectable 4 milliGRAM(s) IV Push every 8 hours PRN Nausea and/or Vomiting  oxyCODONE    IR 5 milliGRAM(s) Oral every 4 hours PRN Severe Pain (7 - 10)      REVIEW OF SYSTEMS:  CONSTITUTIONAL:  No fevers or chills, good appetite, good general state  EYES/ENT:  No visual changes;  No vertigo or throat pain   NECK:  No neck pain or stiffness  RESPIRATORY:  No cough, wheezing, hemoptysis; No shortness of breath  CARDIOVASCULAR:  No chest pain or palpitations  GASTROINTESTINAL:  No abdominal pain. No nausea, vomiting, or hematemesis; No diarrhea or constipation. No melena or hematochezia.  GENITOURINARY:  No dysuria, frequency or hematuria  NEUROLOGICAL:  No HA, no numbness or LE weakness  MSK: leg pain 5/10 today, still from lower back to bilateral medial and lateral thighs, worse on L leg than R   SKIN:  No itching, no skin rash    Vital Signs Last 24 Hrs  T(C): 36.9 (16 Jan 2024 14:24), Max: 37 (15 Gonzalo 2024 21:45)  T(F): 98.4 (16 Jan 2024 14:24), Max: 98.6 (15 Gonzalo 2024 21:45)  HR: 74 (16 Jan 2024 14:24) (60 - 87)  BP: 117/65 (16 Jan 2024 14:24) (102/80 - 130/60)  BP(mean): 80 (15 Gonzalo 2024 21:45) (80 - 80)  RR: 18 (16 Jan 2024 14:24) (17 - 18)  SpO2: 98% (16 Jan 2024 14:24) (98% - 99%)    Parameters below as of 16 Jan 2024 14:24  Patient On (Oxygen Delivery Method): room air        PHYSICAL EXAM:  VITALS: Vital Signs Last 24 Hrs  T(C): 36.9 (16 Jan 2024 14:24), Max: 37 (15 Gonzalo 2024 21:45)  T(F): 98.4 (16 Jan 2024 14:24), Max: 98.6 (15 Gonzalo 2024 21:45)  HR: 74 (16 Jan 2024 14:24) (60 - 87)  BP: 117/65 (16 Jan 2024 14:24) (102/80 - 130/60)  BP(mean): 80 (15 Gonzalo 2024 21:45) (80 - 80)  RR: 18 (16 Jan 2024 14:24) (17 - 18)  SpO2: 98% (16 Jan 2024 14:24) (98% - 99%)    Parameters below as of 16 Jan 2024 14:24  Patient On (Oxygen Delivery Method): room air      Gen: AOx3, NAD, non-toxic, pleasant  HEAD:  Atraumatic, Normocephalic  EYES: PERRLA, conjunctiva and sclera clear  ENT: Moist mucous membranes  NECK: Supple, No JVD  CV: S1+S2 normal, no murmurs   Resp: Clear bilat, no resp distress, no crackles/wheezes  Abd: Soft, nontender, +BS  Ext: No LE edema, no cyanosis, LE pulses present  : No wynne   IV/Skin: No thrombophlebitis, no skin rash  Msk: No arthralgias, no joint swelling  Neuro: AAOx3. No sensory deficits, no motor deficits                          11.2   7.41  )-----------( 275      ( 15 Gonzalo 2024 05:07 )             35.4     01-15    140  |  111<H>  |  15  ----------------------------<  94  4.3   |  26  |  0.86    Ca    9.2      15 Gonzalo 2024 05:07                CAPILLARY BLOOD GLUCOSE      RADIOLOGY & ADDITIONAL TESTS:                   PGY-1 Progress Note discussed with attending    PAGER #: [[phone]] TILL 5:00 PM  PLEASE CONTACT ON CALL TEAM:  - On Call Team (Please refer to Albino) FROM 5:00 PM - 8:30PM  - Nightfloat Team FROM 8:30 -7:30 AM    CHIEF COMPLAINT & BRIEF HOSPITAL COURSE:    INTERVAL HPI/OVERNIGHT EVENTS:   MEDICATIONS  (STANDING):  acetaminophen     Tablet .. 1000 milliGRAM(s) Oral every 6 hours  cyclobenzaprine 10 milliGRAM(s) Oral three times a day  enoxaparin Injectable 40 milliGRAM(s) SubCutaneous every 12 hours  gabapentin 300 milliGRAM(s) Oral three times a day  ketorolac   Injectable 15 milliGRAM(s) IV Push every 8 hours  lidocaine   4% Patch 1 Patch Transdermal daily  pantoprazole    Tablet 40 milliGRAM(s) Oral before breakfast  polyethylene glycol 3350 17 Gram(s) Oral daily  senna 2 Tablet(s) Oral at bedtime    MEDICATIONS  (PRN):  bisacodyl 5 milliGRAM(s) Oral every 12 hours PRN Constipation  ondansetron Injectable 4 milliGRAM(s) IV Push every 8 hours PRN Nausea and/or Vomiting  oxyCODONE    IR 5 milliGRAM(s) Oral every 4 hours PRN Severe Pain (7 - 10)      REVIEW OF SYSTEMS:  CONSTITUTIONAL:  No fevers or chills, good appetite, good general state  EYES/ENT:  No visual changes;  No vertigo or throat pain   NECK:  No neck pain or stiffness  RESPIRATORY:  No cough, wheezing, hemoptysis; No shortness of breath  CARDIOVASCULAR:  No chest pain or palpitations  GASTROINTESTINAL:  No abdominal pain. No nausea, vomiting, or hematemesis; No diarrhea or constipation. No melena or hematochezia.  GENITOURINARY:  No dysuria, frequency or hematuria  NEUROLOGICAL:  No HA, no numbness or LE weakness  MSK: leg pain 5/10 today, still from lower back to bilateral medial and lateral thighs, worse on L leg than R   SKIN:  No itching, no skin rash    Vital Signs Last 24 Hrs  T(C): 36.9 (16 Jan 2024 14:24), Max: 37 (15 Gonzalo 2024 21:45)  T(F): 98.4 (16 Jan 2024 14:24), Max: 98.6 (15 Gonzalo 2024 21:45)  HR: 74 (16 Jan 2024 14:24) (60 - 87)  BP: 117/65 (16 Jan 2024 14:24) (102/80 - 130/60)  BP(mean): 80 (15 Gonzalo 2024 21:45) (80 - 80)  RR: 18 (16 Jan 2024 14:24) (17 - 18)  SpO2: 98% (16 Jan 2024 14:24) (98% - 99%)    Parameters below as of 16 Jan 2024 14:24  Patient On (Oxygen Delivery Method): room air        PHYSICAL EXAM:  VITALS: Vital Signs Last 24 Hrs  T(C): 36.9 (16 Jan 2024 14:24), Max: 37 (15 Gonzalo 2024 21:45)  T(F): 98.4 (16 Jan 2024 14:24), Max: 98.6 (15 Gonzalo 2024 21:45)  HR: 74 (16 Jan 2024 14:24) (60 - 87)  BP: 117/65 (16 Jan 2024 14:24) (102/80 - 130/60)  BP(mean): 80 (15 Gonzalo 2024 21:45) (80 - 80)  RR: 18 (16 Jan 2024 14:24) (17 - 18)  SpO2: 98% (16 Jan 2024 14:24) (98% - 99%)    Parameters below as of 16 Jan 2024 14:24  Patient On (Oxygen Delivery Method): room air      Gen: AOx3, NAD, non-toxic, pleasant  HEAD:  Atraumatic, Normocephalic  EYES: PERRLA, conjunctiva and sclera clear  ENT: Moist mucous membranes  NECK: Supple, No JVD  CV: S1+S2 normal, no murmurs   Resp: Clear bilat, no resp distress, no crackles/wheezes  Abd: Soft, nontender, +BS  Ext: No LE edema, no cyanosis, LE pulses present  : No wynne   IV/Skin: No thrombophlebitis, no skin rash  Msk: No arthralgias, no joint swelling  Neuro: AAOx3. No sensory deficits, no motor deficits                          11.2   7.41  )-----------( 275      ( 15 Gonzalo 2024 05:07 )             35.4     01-15    140  |  111<H>  |  15  ----------------------------<  94  4.3   |  26  |  0.86    Ca    9.2      15 Gonzalo 2024 05:07                CAPILLARY BLOOD GLUCOSE      RADIOLOGY & ADDITIONAL TESTS:

## 2024-01-16 NOTE — DISCHARGE NOTE PROVIDER - CARE PROVIDERS DIRECT ADDRESSES
,[email or web address],DirectAddress_Unknown,[email or web address],DirectAddress_Unknown ,[email or web address],[email or web address],[email or web address],DirectAddress_Unknown

## 2024-01-16 NOTE — CONSULT NOTE ADULT - SUBJECTIVE AND OBJECTIVE BOX
Date of Service  01-16-24 @ 10:52    CHIEF COMPLAINT:Patient is a 33y old  Female who presents with a chief complaint of Pain in the back and weakness of legs (15 Gonzalo 2024 12:55)      HPI:  33 y.o. F PMHx of morbid obesity presents to the ED today with lower back pain.  Patient reports yesterday she suddenly had sharp pain and stiffness in her lower back rating down both legs.  Patient states that she has no comfortable position when she sitting or standing she has pain.  Denies any numbness, tingling, sensory losses, incontinence or inability to move lower extremities. States she did not take anything for the pain. Otherwise pt denies any chest pain, palpitations, fever, chills, headache, visual changes, nausea, vomiting diarrhea.Patient not been evaluated for JENNIFER.   (14 Jan 2024 19:08)      PAST MEDICAL & SURGICAL HISTORY:  Obesity    MEDICATIONS  (STANDING):  acetaminophen     Tablet .. 1000 milliGRAM(s) Oral every 6 hours  cyclobenzaprine 10 milliGRAM(s) Oral three times a day  enoxaparin Injectable 40 milliGRAM(s) SubCutaneous every 12 hours  gabapentin 300 milliGRAM(s) Oral three times a day  ketorolac   Injectable 15 milliGRAM(s) IV Push every 8 hours  lidocaine   4% Patch 1 Patch Transdermal daily  pantoprazole    Tablet 40 milliGRAM(s) Oral before breakfast  polyethylene glycol 3350 17 Gram(s) Oral daily  senna 2 Tablet(s) Oral at bedtime    MEDICATIONS  (PRN):  bisacodyl 5 milliGRAM(s) Oral every 12 hours PRN Constipation  ondansetron Injectable 4 milliGRAM(s) IV Push every 8 hours PRN Nausea and/or Vomiting  oxyCODONE    IR 5 milliGRAM(s) Oral every 4 hours PRN Severe Pain (7 - 10)      FAMILY HISTORY:Parents-CAD      SOCIAL HISTORY:    [x ] Non-smoker    [x ] Alcohol    Allergies    cefazolin (Hives)    Intolerances    	    REVIEW OF SYSTEMS:  CONSTITUTIONAL: No fever, weight loss, or fatigue  EYES: No eye pain, visual disturbances, or discharge  ENT:  No difficulty hearing, tinnitus, vertigo; No sinus or throat pain  NECK: No pain or stiffness  RESPIRATORY: No cough, wheezing, chills or hemoptysis; + Shortness of Breath  CARDIOVASCULAR: No chest pain, palpitations, passing out, dizziness, or leg swelling  GASTROINTESTINAL: No abdominal or epigastric pain. No nausea, vomiting, or hematemesis; No diarrhea or constipation. No melena or hematochezia.  GENITOURINARY: No dysuria, frequency, hematuria, or incontinence  NEUROLOGICAL: No headaches, memory loss, loss of strength, numbness, or tremors  SKIN: No itching, burning, rashes, or lesions   LYMPH Nodes: No enlarged glands  ENDOCRINE: No heat or cold intolerance; No hair loss  MUSCULOSKELETAL: No joint pain or swelling; No muscle,+ back pain  PSYCHIATRIC: No depression, anxiety, mood swings, or difficulty sleeping  HEME/LYMPH: No easy bruising, or bleeding gums  ALLERGY AND IMMUNOLOGIC: No hives or eczema	        PHYSICAL EXAM:  T(C): 36.5 (01-16-24 @ 05:36), Max: 37 (01-15-24 @ 21:45)  HR: 60 (01-16-24 @ 05:36) (60 - 77)  BP: 102/80 (01-16-24 @ 05:36) (102/80 - 141/72)  RR: 18 (01-16-24 @ 05:36) (16 - 18)  SpO2: 99% (01-16-24 @ 05:36) (98% - 99%)  Wt(kg): --  I&O's Summary      Appearance: Normal	  HEENT:   Normal oral mucosa, PERRL, EOMI	  Lymphatic: No lymphadenopathy  Cardiovascular: Normal S1 S2, No JVD, No murmurs, No edema  Respiratory: Lungs clear to auscultation	  Psychiatry: A & O x 3, Mood & affect appropriate  Gastrointestinal:  Soft, Non-tender, + BS	  Skin: No rashes, No ecchymoses, No cyanosis	  Neurologic: Non-focal  Extremities: Normal range of motion, No clubbing, cyanosis or edema  Vascular: Peripheral pulses palpable 2+ bilaterally    	  	  LABS:	 	                        11.2   7.41  )-----------( 275      ( 15 Gonzalo 2024 05:07 )             35.4     01-15    140  |  111<H>  |  15  ----------------------------<  94  4.3   |  26  |  0.86    Ca    9.2      15 Gonzalo 2024 05:07      < from: Xray Lumbosacral Spine (01.14.24 @ 18:03) >  ACC: [id number] EXAM:  XR LS SPINE MIN 4 VIEWS   ORDERED BY: CHARISSE BE     PROCEDURE DATE:  01/14/2024          INTERPRETATION:  CLINICAL HISTORY: Pain.    Lumbosacral Spine.    Frontal, lateral and cone-down projections demonstrate satisfactory  alignment of the bony structures. There is a mild, rotatory,   thoracolumbar scoliosis convexity to the patient's left. No fracture or   vertebral compression can be appreciated. There is no evidence of pedicle   destruction. There is mild disc space narrowing at the L3-4, L4-5 and   L5-S1 levels. The sacroiliac joints are symmetric.    IMPRESSION: Mild degenerative disc disease. If clinically indicated,   further assessment with MRI is recommended.    --- End of Report ---            AMINTA LORENZANA MD; Attending Radiologist  This document has been electronically signed. Gonzalo 15 2024  9:57AM    < end of copied text >  < from: CT Angio Chest PE Protocol w/ IV Cont (09.03.23 @ 08:23) >  ACC: [id number] EXAM:  CT ANGIO CHEST PULM ART WAWIC   ORDERED BY:   IBANKAL BE     PROCEDURE DATE:  09/03/2023          INTERPRETATION:  CLINICAL INFORMATION: Evaluate for pulmonary embolus.    COMPARISON: None.    CONTRAST/COMPLICATIONS:  IV Contrast: Omnipaque 350  50 cc administered   0 cc discarded  Oral Contrast: NONE  Complications: None reported at time of study completion    PROCEDURE:  CT Angiography of the Chest.  Sagittal and coronal reformats were performed as well as 3D (MIP)   reconstructions.    FINDINGS:    LUNGS AND AIRWAYS: Central airways are patent. No large focal   consolidation.  PLEURA: No pleural effusion or pneumothorax  MEDIASTINUM AND REA: No enlarged lymph nodes of the thorax  VESSELS: No pulmonary embolus upto segmental level. Subsegmental   pulmonary artery branches are incompletely evaluated due to poor   opacification. Main pulmonary artery is enlarged measuring 3.4 cm. This   may reflect pulmonary arterial hypertension. Normal caliber of the   thoracic aorta.  HEART: Heart size is qualitatively within normal limits. Trace   pericardial fluid.  CHEST WALL AND LOWER NECK: Findings of the neck are reviewed and reported   on separately. Please refer to separate report. No chest wall hematoma.   Partially imaged left breast with 12 mm nodular density on image 79   series 7, of unclear etiology. Recommend mammogram/ultrasound at a   dedicated breast imaging center.  VISUALIZED UPPER ABDOMEN: Subcentimeter hepatic hypodensity is too small   to characterize. Small volume nodes of the partially imaged upper abdomen   under 1 cm in short axis.  BONES: No aggressive osseous lesion.    IMPRESSION:    No pulmonary embolus up to segmental level. Subsegmental pulmonary artery   branches are incompletely evaluated due to poor opacification. Enlarged   main pulmonary artery may reflect pulmonary arterial hypertension.    Partially imaged left breast with 12 mm nodular density on image 79   series 7, of unclear etiology. Recommend mammogram/ultrasoundat a   dedicated breast imaging center.    Findings of the neck are reviewed and reported on separately. Please   refer to separate report.    --- End of Report ---            EDGAR ARTIS M.D., ATTENDING RADIOLOGIST  This document has been electronically signed. Sep  3 2023  8:32AM    < end of copied text >       Date of Service  01-16-24 @ 10:52    CHIEF COMPLAINT:Patient is a 33y old  Female who presents with a chief complaint of Pain in the back and weakness of legs (15 Gonzalo 2024 12:55)      HPI:  33 y.o. F PMHx of morbid obesity presents to the ED today with lower back pain.  Patient reports yesterday she suddenly had sharp pain and stiffness in her lower back rating down both legs.  Patient states that she has no comfortable position when she sitting or standing she has pain.  Denies any numbness, tingling, sensory losses, incontinence or inability to move lower extremities. States she did not take anything for the pain. Otherwise pt denies any chest pain, palpitations, fever, chills, headache, visual changes, nausea, vomiting diarrhea.Patient not been evaluated for JENNIFER.   (14 Jan 2024 19:08)      PAST MEDICAL & SURGICAL HISTORY:  Obesity    MEDICATIONS  (STANDING):  acetaminophen     Tablet .. 1000 milliGRAM(s) Oral every 6 hours  cyclobenzaprine 10 milliGRAM(s) Oral three times a day  enoxaparin Injectable 40 milliGRAM(s) SubCutaneous every 12 hours  gabapentin 300 milliGRAM(s) Oral three times a day  ketorolac   Injectable 15 milliGRAM(s) IV Push every 8 hours  lidocaine   4% Patch 1 Patch Transdermal daily  pantoprazole    Tablet 40 milliGRAM(s) Oral before breakfast  polyethylene glycol 3350 17 Gram(s) Oral daily  senna 2 Tablet(s) Oral at bedtime    MEDICATIONS  (PRN):  bisacodyl 5 milliGRAM(s) Oral every 12 hours PRN Constipation  ondansetron Injectable 4 milliGRAM(s) IV Push every 8 hours PRN Nausea and/or Vomiting  oxyCODONE    IR 5 milliGRAM(s) Oral every 4 hours PRN Severe Pain (7 - 10)      FAMILY HISTORY:Parents-CAD      SOCIAL HISTORY:    [x ] Non-smoker    [x ] Alcohol    Allergies    cefazolin (Hives)    Intolerances    	    REVIEW OF SYSTEMS:  CONSTITUTIONAL: No fever, weight loss, or fatigue  EYES: No eye pain, visual disturbances, or discharge  ENT:  No difficulty hearing, tinnitus, vertigo; No sinus or throat pain  NECK: No pain or stiffness  RESPIRATORY: No cough, wheezing, chills or hemoptysis; + Shortness of Breath  CARDIOVASCULAR: No chest pain, palpitations, passing out, dizziness, or leg swelling  GASTROINTESTINAL: No abdominal or epigastric pain. No nausea, vomiting, or hematemesis; No diarrhea or constipation. No melena or hematochezia.  GENITOURINARY: No dysuria, frequency, hematuria, or incontinence  NEUROLOGICAL: No headaches, memory loss, loss of strength, numbness, or tremors  SKIN: No itching, burning, rashes, or lesions   LYMPH Nodes: No enlarged glands  ENDOCRINE: No heat or cold intolerance; No hair loss  MUSCULOSKELETAL: No joint pain or swelling; No muscle,+ back pain  PSYCHIATRIC: No depression, anxiety, mood swings, or difficulty sleeping  HEME/LYMPH: No easy bruising, or bleeding gums  ALLERGY AND IMMUNOLOGIC: No hives or eczema	        PHYSICAL EXAM:  T(C): 36.5 (01-16-24 @ 05:36), Max: 37 (01-15-24 @ 21:45)  HR: 60 (01-16-24 @ 05:36) (60 - 77)  BP: 102/80 (01-16-24 @ 05:36) (102/80 - 141/72)  RR: 18 (01-16-24 @ 05:36) (16 - 18)  SpO2: 99% (01-16-24 @ 05:36) (98% - 99%)  Wt(kg): --  I&O's Summary      Appearance: Normal	  HEENT:   Normal oral mucosa, PERRL, EOMI	  Lymphatic: No lymphadenopathy  Cardiovascular: Normal S1 S2, No JVD, No murmurs, No edema  Respiratory: Lungs clear to auscultation	  Psychiatry: A & O x 3, Mood & affect appropriate  Gastrointestinal:  Soft, Non-tender, + BS	  Skin: No rashes, No ecchymoses, No cyanosis	  Neurologic: Non-focal  Extremities: Normal range of motion, No clubbing, cyanosis or edema  Vascular: Peripheral pulses palpable 2+ bilaterally    	  	  LABS:	 	                        11.2   7.41  )-----------( 275      ( 15 Gonzalo 2024 05:07 )             35.4     01-15    140  |  111<H>  |  15  ----------------------------<  94  4.3   |  26  |  0.86    Ca    9.2      15 Gonzalo 2024 05:07      < from: Xray Lumbosacral Spine (01.14.24 @ 18:03) >  ACC: [id number] EXAM:  XR LS SPINE MIN 4 VIEWS   ORDERED BY: CHARISSE BE     PROCEDURE DATE:  01/14/2024          INTERPRETATION:  CLINICAL HISTORY: Pain.    Lumbosacral Spine.    Frontal, lateral and cone-down projections demonstrate satisfactory  alignment of the bony structures. There is a mild, rotatory,   thoracolumbar scoliosis convexity to the patient's left. No fracture or   vertebral compression can be appreciated. There is no evidence of pedicle   destruction. There is mild disc space narrowing at the L3-4, L4-5 and   L5-S1 levels. The sacroiliac joints are symmetric.    IMPRESSION: Mild degenerative disc disease. If clinically indicated,   further assessment with MRI is recommended.    --- End of Report ---            AMINTA LORENZANA MD; Attending Radiologist  This document has been electronically signed. Gonzalo 15 2024  9:57AM    < end of copied text >  < from: CT Angio Chest PE Protocol w/ IV Cont (09.03.23 @ 08:23) >  ACC: [id number] EXAM:  CT ANGIO CHEST PULM ART WAWIC   ORDERED BY:   IBANKAL BE     PROCEDURE DATE:  09/03/2023          INTERPRETATION:  CLINICAL INFORMATION: Evaluate for pulmonary embolus.    COMPARISON: None.    CONTRAST/COMPLICATIONS:  IV Contrast: Omnipaque 350  50 cc administered   0 cc discarded  Oral Contrast: NONE  Complications: None reported at time of study completion    PROCEDURE:  CT Angiography of the Chest.  Sagittal and coronal reformats were performed as well as 3D (MIP)   reconstructions.    FINDINGS:    LUNGS AND AIRWAYS: Central airways are patent. No large focal   consolidation.  PLEURA: No pleural effusion or pneumothorax  MEDIASTINUM AND REA: No enlarged lymph nodes of the thorax  VESSELS: No pulmonary embolus upto segmental level. Subsegmental   pulmonary artery branches are incompletely evaluated due to poor   opacification. Main pulmonary artery is enlarged measuring 3.4 cm. This   may reflect pulmonary arterial hypertension. Normal caliber of the   thoracic aorta.  HEART: Heart size is qualitatively within normal limits. Trace   pericardial fluid.  CHEST WALL AND LOWER NECK: Findings of the neck are reviewed and reported   on separately. Please refer to separate report. No chest wall hematoma.   Partially imaged left breast with 12 mm nodular density on image 79   series 7, of unclear etiology. Recommend mammogram/ultrasound at a   dedicated breast imaging center.  VISUALIZED UPPER ABDOMEN: Subcentimeter hepatic hypodensity is too small   to characterize. Small volume nodes of the partially imaged upper abdomen   under 1 cm in short axis.  BONES: No aggressive osseous lesion.    IMPRESSION:    No pulmonary embolus up to segmental level. Subsegmental pulmonary artery   branches are incompletely evaluated due to poor opacification. Enlarged   main pulmonary artery may reflect pulmonary arterial hypertension.    Partially imaged left breast with 12 mm nodular density on image 79   series 7, of unclear etiology. Recommend mammogram/ultrasoundat a   dedicated breast imaging center.    Findings of the neck are reviewed and reported on separately. Please   refer to separate report.    --- End of Report ---            EDGAR ARTIS M.D., ATTENDING RADIOLOGIST  This document has been electronically signed. Sep  3 2023  8:32AM    < end of copied text >

## 2024-01-16 NOTE — PROGRESS NOTE ADULT - PROBLEM SELECTOR PLAN 1
Pt with acute lumbar back pain, greatest over left lumbar back radiating to left hip, and upper thigh which is somatic and neuropathic in nature. Xray demonstrates DDD. Pending MRI cervical, thoracic and lumbar. and MRI head per neurology.   Opioid pain recommendations   - Continue oxycodone 5 mg PO q 4 hours PRN severe pain. Monitor for sedation/ respiratory depression.   Non-opioid pain recommendations   - Continue Flexeril 10mg PO TID x 3 days.   - Decrease Toradol 15mg IVP q 8 hours 24hrs with PPI.   - Continue Acetaminophen 1 gram PO q 6 hours x 4 days. Monitor LFTs  - Continue Gabapentin 300mg po q 8 hours. Monitor renal function.   - Continue Lidoderm 4% patch daily.   Bowel Regimen  - Continue Miralax 17G PO daily  - Continue Senna 2 tablets at bedtime for constipation  - Dulcolax 5mg PO BID PRN constipation   Mild pain (score 1-3)  - Non-pharmacological pain treatment recommendations  - Warm/ Cool packs PRN   - Repositioning extremity, elevation, imagery, relaxation, distraction.  - Physical therapy OOB if no contraindications   Recommendations discussed with primary team and RN.

## 2024-01-16 NOTE — PROGRESS NOTE ADULT - SUBJECTIVE AND OBJECTIVE BOX
Source of information: COLLEEN GROSS, Chart review  Patient language: English  : n/a    HPI:  33 y.o. F PMHx of morbid obesity presents to the ED today with lower back pain.  Patient reports yesterday she suddenly had sharp pain and stiffness in her lower back rating down both legs.  Patient states that she has no comfortable position when she sitting or standing she has pain.  Denies any numbness, tingling, sensory losses, incontinence or inability to move lower extremities. States she did not take anything for the pain. Otherwise pt denies any chest pain, palpitations, shortness of breath, fever, chills, headache, visual changes, nausea, vomiting diarrhea. NKDA   (14 Jan 2024 19:08)    Pt is admitted for dorsalgia. LS xray demonstrates- Mild degenerative disc disease. Pending MRI head, cervical, thoracic and lumbar spine. Neuro following. Pain consulted 1/15. Reports lumbar back pain started on 1/13. Denies hx heavy lifting, injury, falls. Denies hx back pain in the past. Reports pain progressively worsened on 1/13 for which she took Tylenol Motrin and Bengay at home with minimal relief. On 1/14 pt was having difficulty getting out of bed due to her back pain. Denies bladder/ bowel incontinence, saddle anesthesia. Reports occasional numbness/ tingling of b/l hands which occurs when laying on arm.   Pt seen and examined at bedside. Pt laying in bed, reports lumbar back pain has slightly improved since admission. Rating back pain score 5/10 and tolerable while laying, increases 8+/10 upon turning, standing and ambulating SCALE USED: (1-10 VNRS). Reports pain over right lumbar back and leg subsided. Experiencing pain over left lumbar back radiating to left buttock, and left thigh, occasionally to left abdomen upon ambulating. Pt describes pain as constant, aching, occasionally shooting, alleviated by pain medication, exacerbated by movement and ambulation. Reports she no longer has pain when lifting her right leg, and can lift her left leg slightly higher today (limited by pain). Pt tolerating PO diet. Denies chest pain, SOB, nausea, vomiting. Reports nausea after taking toradol. Denies nausea at this time. Also reports constipation. Last BM 1/14. Requesting laxative. Patient stated goal for pain control: to be able to take deep breaths, get out of bed to chair and ambulate with tolerable pain control. Pt able to take a couple steps however with increased pain.     PAST MEDICAL & SURGICAL HISTORY:      FAMILY HISTORY:      Social History:  Pt lives at home with family, ambulates independently, denies any tobacco, alcohol, drug use. (14 Jan 2024 19:08)   [ X] Denies ETOH use, illicit drug use and smoking    Allergies    cefazolin (Hives)    MEDICATIONS  (STANDING):  acetaminophen     Tablet .. 1000 milliGRAM(s) Oral every 6 hours  cyclobenzaprine 10 milliGRAM(s) Oral three times a day  enoxaparin Injectable 40 milliGRAM(s) SubCutaneous every 12 hours  gabapentin 300 milliGRAM(s) Oral three times a day  ketorolac   Injectable 15 milliGRAM(s) IV Push every 8 hours  lidocaine   4% Patch 1 Patch Transdermal daily  pantoprazole    Tablet 40 milliGRAM(s) Oral before breakfast  polyethylene glycol 3350 17 Gram(s) Oral daily  senna 2 Tablet(s) Oral at bedtime    MEDICATIONS  (PRN):  bisacodyl 5 milliGRAM(s) Oral every 12 hours PRN Constipation  ondansetron Injectable 4 milliGRAM(s) IV Push every 8 hours PRN Nausea and/or Vomiting  oxyCODONE    IR 5 milliGRAM(s) Oral every 4 hours PRN Severe Pain (7 - 10)      Vital Signs Last 24 Hrs  T(C): 36.5 (16 Jan 2024 05:36), Max: 37 (15 Gonzalo 2024 21:45)  T(F): 97.7 (16 Jan 2024 05:36), Max: 98.6 (15 Gonzalo 2024 21:45)  HR: 60 (16 Jan 2024 05:36) (60 - 77)  BP: 102/80 (16 Jan 2024 05:36) (102/80 - 141/72)  BP(mean): 80 (15 Gonzalo 2024 21:45) (80 - 80)  RR: 18 (16 Jan 2024 05:36) (16 - 18)  SpO2: 99% (16 Jan 2024 05:36) (98% - 99%)    Parameters below as of 16 Jan 2024 05:36  Patient On (Oxygen Delivery Method): room air        LABS: Reviewed                          11.2   7.41  )-----------( 275      ( 15 Gonzalo 2024 05:07 )             35.4     01-15    140  |  111<H>  |  15  ----------------------------<  94  4.3   |  26  |  0.86    Ca    9.2      15 Gonzalo 2024 05:07          Urinalysis Basic - ( 15 Gonzalo 2024 05:07 )    Color: x / Appearance: x / SG: x / pH: x  Gluc: 94 mg/dL / Ketone: x  / Bili: x / Urobili: x   Blood: x / Protein: x / Nitrite: x   Leuk Esterase: x / RBC: x / WBC x   Sq Epi: x / Non Sq Epi: x / Bacteria: x    Radiology: Reviewed.   < from: Xray Lumbosacral Spine (01.14.24 @ 18:03) >    ACC: [id number] EXAM:  XR LS SPINE MIN 4 VIEWS   ORDERED BY: CHARISSE BE     PROCEDURE DATE:  01/14/2024          INTERPRETATION:  CLINICAL HISTORY: Pain.    Lumbosacral Spine.    Frontal, lateral and cone-down projections demonstrate satisfactory  alignment of the bony structures. There is a mild, rotatory,   thoracolumbar scoliosis convexity to the patient's left. No fracture or   vertebral compression can be appreciated. There is no evidence of pedicle   destruction. There is mild disc space narrowing at the L3-4, L4-5 and   L5-S1 levels. The sacroiliac joints are symmetric.    IMPRESSION: Mild degenerative disc disease. If clinically indicated,   further assessment with MRI is recommended.    --- End of Report ---            AMINTA LORENZANA MD; Attending Radiologist  This document has been electronically signed. Gonzalo 15 2024  9:57AM    < end of copied text >      ORT Score -   Family Hx of substance abuse	Female	      Male  Alcohol 	                                           1                     3  Illegal drugs	                                   2                     3  Rx drugs                                           4 	                  4  Personal Hx of substance abuse		  Alcohol 	                                          3	                  3  Illegal drugs                                     4	                  4  Rx drugs                                            5 	                  5  Age between 16- 45 years	           1                     1  hx preadolescent sexual abuse	   3 	                  0  Psychological disease		  ADD, OCD, bipolar, schizophrenia   2	          2  Depression                                           1 	          1  Total: 1    a score of 3 or lower indicates low risk for opioid abuse		  a score of 4-7 indicates moderate risk for opioid abuse		  a score of 8 or higher indicates high risk for opioid abuse    REVIEW OF SYSTEMS:  CONSTITUTIONAL: No fever or fatigue  HEENT:  No difficulty hearing, no change in vision  NECK: No pain or stiffness  RESPIRATORY: No cough, wheezing, chills or hemoptysis; No shortness of breath  CARDIOVASCULAR: No chest pain, palpitations, dizziness, or leg swelling  GASTROINTESTINAL: No loss of appetite, decreased PO intake. No abdominal or epigastric pain. No nausea, vomiting; No diarrhea or constipation.   GENITOURINARY: No dysuria, frequency, hematuria, retention or incontinence  MUSCULOSKELETAL: + lumbar back pain radiating to b/l legs; No joint swelling; no upper or lower motor strength weakness, no saddle anesthesia, bowel/bladder incontinence, no falls   NEURO: No headaches, + occasional numbness/tingling b/l hand (upon pressure), No weakness    PHYSICAL EXAM:  GENERAL:  Alert & Oriented X4, cooperative, NAD, Good concentration. Speech is clear.   RESPIRATORY: Respirations even and unlabored. Clear to auscultation bilaterally; No rales, rhonchi, wheezing, or rubs  CARDIOVASCULAR: Normal S1/S2, regular rate and rhythm; No murmurs, rubs, or gallops. No JVD.   GASTROINTESTINAL: + morbid obesity per BMI, Soft, Nontender, Nondistended; Bowel sounds present  PERIPHERAL VASCULAR:  Extremities warm without edema. 2+ Peripheral Pulses, No cyanosis, No calf tenderness  MUSCULOSKELETAL: Motor Strength 5/5 B/L upper and lower extremities; moves all extremities equally against gravity; ROM intact; +SLR left leg at 30 degrees, negative right SLR today at 45 degrees. + lumbar back tenderness on palpation left lumbar > right lumbar.   SKIN: Warm, dry, intact. No rashes, lesions, scars or wounds.     Risk factors associated with adverse outcomes related to opioid treatment  [ ]  Concurrent benzodiazepine use  [ ]  History/ Active substance use or alcohol use disorder  [ ] Psychiatric co-morbidity  [ ] Sleep apnea  [ ] COPD  [X ] BMI> 35  [ ] Liver dysfunction  [ ] Renal dysfunction  [ ] CHF  [ ] Smoker  [ ]  Age > 60 years    [X ]  [place]  Reviewed and Copied to Chart. See below.    Plan of care and goal oriented pain management treatment options were discussed with patient and /or primary care giver; all questions and concerns were addressed and care was aligned with patient's wishes.    Educated patient on goal oriented pain management treatment options     01-16-24 @ 11:14   Source of information: COLLEEN GROSS, Chart review  Patient language: English  : n/a    HPI:  33 y.o. F PMHx of morbid obesity presents to the ED today with lower back pain.  Patient reports yesterday she suddenly had sharp pain and stiffness in her lower back rating down both legs.  Patient states that she has no comfortable position when she sitting or standing she has pain.  Denies any numbness, tingling, sensory losses, incontinence or inability to move lower extremities. States she did not take anything for the pain. Otherwise pt denies any chest pain, palpitations, shortness of breath, fever, chills, headache, visual changes, nausea, vomiting diarrhea. NKDA   (14 Jan 2024 19:08)    Pt is admitted for dorsalgia. LS xray demonstrates- Mild degenerative disc disease. Pending MRI head, cervical, thoracic and lumbar spine. Neuro following. Pain consulted 1/15. Reports lumbar back pain started on 1/13. Denies hx heavy lifting, injury, falls. Denies hx back pain in the past. Reports pain progressively worsened on 1/13 for which she took Tylenol Motrin and Bengay at home with minimal relief. On 1/14 pt was having difficulty getting out of bed due to her back pain. Denies bladder/ bowel incontinence, saddle anesthesia. Reports occasional numbness/ tingling of b/l hands which occurs when laying on arm.   Pt seen and examined at bedside. Pt laying in bed, reports lumbar back pain has slightly improved since admission. Rating back pain score 5/10 and tolerable while laying, increases 8+/10 upon turning, standing and ambulating SCALE USED: (1-10 VNRS). Reports pain over right lumbar back and leg subsided. Experiencing pain over left lumbar back radiating to left buttock, and left thigh, occasionally to left abdomen upon ambulating. Pt describes pain as constant, aching, occasionally shooting, alleviated by pain medication, exacerbated by movement and ambulation. Reports she no longer has pain when lifting her right leg, and can lift her left leg slightly higher today (limited by pain). Pt tolerating PO diet. Denies chest pain, SOB, nausea, vomiting. Reports nausea after taking toradol. Denies nausea at this time. Also reports constipation. Last BM 1/14. Requesting laxative. Patient stated goal for pain control: to be able to take deep breaths, get out of bed to chair and ambulate with tolerable pain control. Pt able to take a couple steps however with increased pain.     PAST MEDICAL & SURGICAL HISTORY:      FAMILY HISTORY:      Social History:  Pt lives at home with family, ambulates independently, denies any tobacco, alcohol, drug use. (14 Jan 2024 19:08)   [ X] Denies ETOH use, illicit drug use and smoking    Allergies    cefazolin (Hives)    MEDICATIONS  (STANDING):  acetaminophen     Tablet .. 1000 milliGRAM(s) Oral every 6 hours  cyclobenzaprine 10 milliGRAM(s) Oral three times a day  enoxaparin Injectable 40 milliGRAM(s) SubCutaneous every 12 hours  gabapentin 300 milliGRAM(s) Oral three times a day  ketorolac   Injectable 15 milliGRAM(s) IV Push every 8 hours  lidocaine   4% Patch 1 Patch Transdermal daily  pantoprazole    Tablet 40 milliGRAM(s) Oral before breakfast  polyethylene glycol 3350 17 Gram(s) Oral daily  senna 2 Tablet(s) Oral at bedtime    MEDICATIONS  (PRN):  bisacodyl 5 milliGRAM(s) Oral every 12 hours PRN Constipation  ondansetron Injectable 4 milliGRAM(s) IV Push every 8 hours PRN Nausea and/or Vomiting  oxyCODONE    IR 5 milliGRAM(s) Oral every 4 hours PRN Severe Pain (7 - 10)      Vital Signs Last 24 Hrs  T(C): 36.5 (16 Jan 2024 05:36), Max: 37 (15 Gonzalo 2024 21:45)  T(F): 97.7 (16 Jan 2024 05:36), Max: 98.6 (15 Gonzalo 2024 21:45)  HR: 60 (16 Jan 2024 05:36) (60 - 77)  BP: 102/80 (16 Jan 2024 05:36) (102/80 - 141/72)  BP(mean): 80 (15 Gonzalo 2024 21:45) (80 - 80)  RR: 18 (16 Jan 2024 05:36) (16 - 18)  SpO2: 99% (16 Jan 2024 05:36) (98% - 99%)    Parameters below as of 16 Jan 2024 05:36  Patient On (Oxygen Delivery Method): room air        LABS: Reviewed                          11.2   7.41  )-----------( 275      ( 15 Gonzaol 2024 05:07 )             35.4     01-15    140  |  111<H>  |  15  ----------------------------<  94  4.3   |  26  |  0.86    Ca    9.2      15 Gonzalo 2024 05:07          Urinalysis Basic - ( 15 Gonzalo 2024 05:07 )    Color: x / Appearance: x / SG: x / pH: x  Gluc: 94 mg/dL / Ketone: x  / Bili: x / Urobili: x   Blood: x / Protein: x / Nitrite: x   Leuk Esterase: x / RBC: x / WBC x   Sq Epi: x / Non Sq Epi: x / Bacteria: x    Radiology: Reviewed.   < from: Xray Lumbosacral Spine (01.14.24 @ 18:03) >    ACC: [id number] EXAM:  XR LS SPINE MIN 4 VIEWS   ORDERED BY: CHARISSE BE     PROCEDURE DATE:  01/14/2024          INTERPRETATION:  CLINICAL HISTORY: Pain.    Lumbosacral Spine.    Frontal, lateral and cone-down projections demonstrate satisfactory  alignment of the bony structures. There is a mild, rotatory,   thoracolumbar scoliosis convexity to the patient's left. No fracture or   vertebral compression can be appreciated. There is no evidence of pedicle   destruction. There is mild disc space narrowing at the L3-4, L4-5 and   L5-S1 levels. The sacroiliac joints are symmetric.    IMPRESSION: Mild degenerative disc disease. If clinically indicated,   further assessment with MRI is recommended.    --- End of Report ---            AMINTA LORENZANA MD; Attending Radiologist  This document has been electronically signed. Gonzalo 15 2024  9:57AM    < end of copied text >      ORT Score -   Family Hx of substance abuse	Female	      Male  Alcohol 	                                           1                     3  Illegal drugs	                                   2                     3  Rx drugs                                           4 	                  4  Personal Hx of substance abuse		  Alcohol 	                                          3	                  3  Illegal drugs                                     4	                  4  Rx drugs                                            5 	                  5  Age between 16- 45 years	           1                     1  hx preadolescent sexual abuse	   3 	                  0  Psychological disease		  ADD, OCD, bipolar, schizophrenia   2	          2  Depression                                           1 	          1  Total: 1    a score of 3 or lower indicates low risk for opioid abuse		  a score of 4-7 indicates moderate risk for opioid abuse		  a score of 8 or higher indicates high risk for opioid abuse    REVIEW OF SYSTEMS:  CONSTITUTIONAL: No fever or fatigue  HEENT:  No difficulty hearing, no change in vision  NECK: No pain or stiffness  RESPIRATORY: No cough, wheezing, chills or hemoptysis; No shortness of breath  CARDIOVASCULAR: No chest pain, palpitations, dizziness, or leg swelling  GASTROINTESTINAL: No loss of appetite, decreased PO intake. No abdominal or epigastric pain. No nausea, vomiting; No diarrhea or constipation.   GENITOURINARY: No dysuria, frequency, hematuria, retention or incontinence  MUSCULOSKELETAL: + lumbar back pain radiating to b/l legs; No joint swelling; no upper or lower motor strength weakness, no saddle anesthesia, bowel/bladder incontinence, no falls   NEURO: No headaches, + occasional numbness/tingling b/l hand (upon pressure), No weakness    PHYSICAL EXAM:  GENERAL:  Alert & Oriented X4, cooperative, NAD, Good concentration. Speech is clear.   RESPIRATORY: Respirations even and unlabored. Clear to auscultation bilaterally; No rales, rhonchi, wheezing, or rubs  CARDIOVASCULAR: Normal S1/S2, regular rate and rhythm; No murmurs, rubs, or gallops. No JVD.   GASTROINTESTINAL: + morbid obesity per BMI, Soft, Nontender, Nondistended; Bowel sounds present  PERIPHERAL VASCULAR:  Extremities warm without edema. 2+ Peripheral Pulses, No cyanosis, No calf tenderness  MUSCULOSKELETAL: Motor Strength 5/5 B/L upper and lower extremities; moves all extremities equally against gravity; ROM intact; +SLR left leg at 30 degrees, negative right SLR today at 45 degrees. + lumbar back tenderness on palpation left lumbar > right lumbar.   SKIN: Warm, dry, intact. No rashes, lesions, scars or wounds.     Risk factors associated with adverse outcomes related to opioid treatment  [ ]  Concurrent benzodiazepine use  [ ]  History/ Active substance use or alcohol use disorder  [ ] Psychiatric co-morbidity  [ ] Sleep apnea  [ ] COPD  [X ] BMI> 35  [ ] Liver dysfunction  [ ] Renal dysfunction  [ ] CHF  [ ] Smoker  [ ]  Age > 60 years    [X ]  [place]  Reviewed and Copied to Chart. See below.    Plan of care and goal oriented pain management treatment options were discussed with patient and /or primary care giver; all questions and concerns were addressed and care was aligned with patient's wishes.    Educated patient on goal oriented pain management treatment options     01-16-24 @ 11:14

## 2024-01-16 NOTE — PROGRESS NOTE ADULT - ASSESSMENT
Confidential Drug Utilization Report  Search Terms: Senait Roque, 1990Search Date: 01/15/2024 10:23:14 AM  The Drug Utilization Report below displays all of the controlled substance prescriptions, if any, that your patient has filled in the last twelve months. The information displayed on this report is compiled from pharmacy submissions to the Department, and accurately reflects the information as submitted by the pharmacies.    This report was requested by: Camille Bergeron | Reference #: [id number]    You have not added a ARMINDA number. Keeping your ARMINDA number(s) up to date on the My ARMINDA # tab will enable the separation of your prescriptions from others in the search results.    Practitioner Count: 0  Pharmacy Count: 0  Current Opioid Prescriptions: 0  Current Benzodiazepine Prescriptions: 0  Current Stimulant Prescriptions: 0      Patient Demographic Information (PDI)       PDI	First Name	Last Name	Birth Date	Gender	Street Address	[facility]	Zip Code  A	Senait Roque	1990	Female	[address] [facility]	[id number]    Prescription Information      PDI Filter:    PDI	Current Rx	Drug Type	Rx Written	Rx Dispensed	Drug	Quantity	Days Supply	Prescriber Name	Prescriber ARMINDA #	Payment Method	Dispenser  A	N	B	05/10/2023	05/17/2023	alprazolam 0.5 mg tablet	60	30	Jozef Sutherland	[id number]	Medicaid	[facility]    * - Details of Drug Type : O = Opioid, B = Benzodiazepine, S = Stimulant    * - Drugs marked with an asterisk are compound drugs. If the compound drug is made up of more than one controlled substance, then each controlled substance will be a separate row in the table. Confidential Drug Utilization Report  Search Terms: Senait Roque, 1990Search Date: 01/15/2024 10:23:14 AM  The Drug Utilization Report below displays all of the controlled substance prescriptions, if any, that your patient has filled in the last twelve months. The information displayed on this report is compiled from pharmacy submissions to the Department, and accurately reflects the information as submitted by the pharmacies.    This report was requested by: Camille Bergeron | Reference #: [id number]    You have not added a ARMINDA number. Keeping your ARMINDA number(s) up to date on the My ARMINDA # tab will enable the separation of your prescriptions from others in the search results.    Practitioner Count: 0  Pharmacy Count: 0  Current Opioid Prescriptions: 0  Current Benzodiazepine Prescriptions: 0  Current Stimulant Prescriptions: 0      Patient Demographic Information (PDI)       PDI	First Name	Last Name	Birth Date	Gender	Street Address	[facility]	Zip Code  A	Senait Roque	1990	Female	[address] [facility]	[id number]    Prescription Information      PDI Filter:    PDI	Current Rx	Drug Type	Rx Written	Rx Dispensed	Drug	Quantity	Days Supply	Prescriber Name	Prescriber ARMINDA #	Payment Method	Dispenser  A	N	B	05/10/2023	05/17/2023	alprazolam 0.5 mg tablet	60	30	Jozef Sutherland	[id number]	Medicaid	[facility]    * - Details of Drug Type : O = Opioid, B = Benzodiazepine, S = Stimulant    * - Drugs marked with an asterisk are compound drugs. If the compound drug is made up of more than one controlled substance, then each controlled substance will be a separate row in the table.

## 2024-01-16 NOTE — DISCHARGE NOTE PROVIDER - NSDCCAREPROVSEEN_GEN_ALL_CORE_FT
Alysia, Mirella Darling, Phuc Cohn, Hasmukh Palencia, Juju Brewer, Soco Rodriguez, Yamini Hoover, Debbie Bergeron, Camille Sow, Cristian

## 2024-01-16 NOTE — DISCHARGE NOTE PROVIDER - HOSPITAL COURSE
33 y.o. F PMHx of morbid obesity presents to the ED with lower back/thigh pain.  Patient reports day prior to admission she suddenly had sharp pain and stiffness in her lower back radiating down both legs.  Patient states that she has no comfortable position when she sitting or standing she has pain. Pain subsided to 5/10 with Pain Mgmt recs. Neuro recommended MRI brain/spine to rule out demyelinating spine and optic nerve disorders. MRI found _______. Neuromyelitis Optica antibody level was ______ and Myelin Associated Glycoprotein antibody was ________   33 y.o. F PMHx of morbid obesity presents to the ED with lower back/thigh pain.  Patient reports day prior to admission she suddenly had sharp pain and stiffness in her lower back radiating down both legs.  Patient states that she has no comfortable position when she sitting or standing she has pain. Pain subsided to 5/10 with Pain Mgmt recs. Neuro recommended MRI brain/spine to rule out demyelinating spine and optic nerve disorders. MRI found normal degenerative changes of the spine and no abnormality of the brain. . Neuromyelitis Optica antibody level was ______ and Myelin Associated Glycoprotein antibody was ________   33 y.o. F PMHx of morbid obesity presents to the ED with lower back/thigh pain.  Patient reports day prior to admission she suddenly had sharp pain and stiffness in her lower back radiating down both legs.  Patient states that she has no comfortable position when she sitting or standing she has pain. Pain subsided to 5/10 with Pain Mgmt recs. She can follow with Pain Mgmt outpatient. Neuro recommended MRI brain/spine to rule out demyelinating spine and optic nerve disorders. MRI showed _____.  She will follow with Dr. Brewer outpatient. It was also recommended that she follow outpatient with Dr. Rodriguez for a sleep study and a TTE.   Given patient's improved clinical status and current hemodynamic stability, decision was made to discharge. Discussed with attending. Please refer to patient's complete medical chart with documents for a full hospital course, for this is only a brief summary.       33 y.o. F PMHx of morbid obesity presents to the ED with lower back/thigh pain.  Patient reports day prior to admission she suddenly had sharp pain and stiffness in her lower back radiating down both legs.  Patient states that she has no comfortable position when she sitting or standing she has pain. Pain subsided to 5/10 with Pain Mgmt recs. She can follow with Pain Mgmt outpatient. Neuro recommended MRI brain/spine to rule out demyelinating spine and optic nerve disorders. MRI showed unremarkable brain, Disc desiccation is seen involving the L2-3 L3-4 L4-5 and L5-S1 levels. L2-3: Disc bulge is seen which is more prominent on the left side which causes minimal effacement of the ventral thecal sac on the left side. Bilateral hypertrophic facet joint changes seen. Mild narrowing of the left spinal canal. Neuromyelitis Optica antibody level and Myelin Associated Glycoprotein antibody for neuromyelitis and MS workup. She will follow with Dr. Brewer outpatient. It was also recommended that she follow outpatient with Dr. Rodriguez for a sleep study and a TTE.   Given patient's improved clinical status and current hemodynamic stability, decision was made to discharge. Discussed with attending. Please refer to patient's complete medical chart with documents for a full hospital course, for this is only a brief summary.       33 y.o. F PMHx of morbid obesity presents to the ED with lower back/thigh pain.  Patient reports day prior to admission she suddenly had sharp pain and stiffness in her lower back radiating down both legs.  Patient states that she has no comfortable position when she sitting or standing she has pain. Pain subsided to 5/10 with Pain Mgmt recs. She can follow with Pain Mgmt outpatient. Neuro recommended MRI brain/spine to rule out demyelinating spine and optic nerve disorders. MRI showed unremarkable brain, Disc desiccation is seen involving the L2-3 L3-4 L4-5 and L5-S1 levels. L2-3: Disc bulge is seen which is more prominent on the left side which causes minimal effacement of the ventral thecal sac on the left side. Bilateral hypertrophic facet joint changes seen. Mild narrowing of the left spinal canal. Neuromyelitis Optica antibody level and Myelin Associated Glycoprotein antibody for neuromyelitis and MS workup. She will follow with Dr. Brewer outpatient and pain management. It was also recommended that she follow outpatient with Dr. Rodriguez for a sleep study and a TTE. Given patient's improved clinical status and current hemodynamic stability, decision was made to discharge. Discussed with attending. Please refer to patient's complete medical chart with documents for a full hospital course, for this is only a brief summary.

## 2024-01-16 NOTE — CONSULT NOTE ADULT - ASSESSMENT
33 y.o. F PMHx of morbid obesity presents to the ED today with lower back pain.  1.Echo-eval for pulm htn.  2.Sleep study as outpatient.  3.Back pain-pain management.  4.Await MRI.  5.Check TSH,FLP,A1c.  6.GI and DVT prophylaxis.  7.Weight loss.

## 2024-01-16 NOTE — DISCHARGE NOTE PROVIDER - PROVIDER TOKENS
PROVIDER:[TOKEN:[[id number]],FOLLOWUP:[2 weeks],ESTABLISHEDPATIENT:[T]],PROVIDER:[TOKEN:[[id number]],FOLLOWUP:[2 weeks],ESTABLISHEDPATIENT:[T]],PROVIDER:[TOKEN:[[id number]],FOLLOWUP:[2 weeks]],FREE:[LAST:[Luque],PHONE:[phone]],FAX:[(   )    -],ADDRESS:[Telepain],FOLLOWUP:[2 weeks]] PROVIDER:[TOKEN:[[id number]],FOLLOWUP:[2 weeks],ESTABLISHEDPATIENT:[T]],PROVIDER:[TOKEN:[[id number]],FOLLOWUP:[2 weeks],ESTABLISHEDPATIENT:[T]],PROVIDER:[TOKEN:[[id number]],FOLLOWUP:[2 weeks]],FREE:[LAST:[Luque],PHONE:[phone]],FAX:[(   )    -],ADDRESS:[Telepain],FOLLOWUP:[2 weeks]]

## 2024-01-16 NOTE — DISCHARGE NOTE PROVIDER - NSDCCPCAREPLAN_GEN_ALL_CORE_FT
PRINCIPAL DISCHARGE DIAGNOSIS  Diagnosis: Back pain  Assessment and Plan of Treatment: You came to the hospital because of back and leg pain that made it difficult for you to walk. You met with Pain Management, who helped control your pain. You will be discharged with ______. Please take as prescribed. It is important that you mobilize yourself, as well, as physical activity can help alleviate pain. You can also use hot or cold compresses as needed and follow with Pain Management outpatient if the pain persists.      SECONDARY DISCHARGE DIAGNOSES  Diagnosis: Obstructive sleep apnea  Assessment and Plan of Treatment: You may have sleep apnea, which means that there are periods during your sleep when you momentarily stop breathing. This can lead to complications including pulmonary hypertension. It is recommended that you follow with Dr. Rodriguez outpatient for an echocardiogram of your heart and for an outpatient sleep study so that any abnormalities can be found and treated.     PRINCIPAL DISCHARGE DIAGNOSIS  Diagnosis: Back pain  Assessment and Plan of Treatment: You came to the hospital because of back and leg pain that made it difficult for you to walk. an MRI showed normal brain, Disc desication is seen involving the L2-3 L3-4 L4-5 and L5-S1 levels secondary to chronic degenerative changes. L2-3: Disc bulge is seen which is more prominent on the left side which causes minimal effacement of the ventral thecal sac on the left side. Bilateral hypertrophic facet joint changes seen. Mild narrowing of the left spinal canal. Neurologist Dr. Brewer evaluated you and we sent further labs to workup for MS and neuromyelitis optica. Please follow up with Dr. Brewer as an outpatient for results and further management (details below)  You met with Pain Management, who helped control your pain. You will be discharged with ______. Please take as prescribed. It is important that you mobilize yourself, as well, as physical activity can help alleviate pain. You can also use hot or cold compresses as needed and follow with Pain Management outpatient if the pain persists.      SECONDARY DISCHARGE DIAGNOSES  Diagnosis: Obstructive sleep apnea  Assessment and Plan of Treatment: You may have sleep apnea, which means that there are periods during your sleep when you momentarily stop breathing. This can lead to complications including pulmonary hypertension. It is recommended that you follow with Dr. Rodriguez outpatient for an echocardiogram of your heart and for an outpatient sleep study so that any abnormalities can be found and treated.     PRINCIPAL DISCHARGE DIAGNOSIS  Diagnosis: Back pain  Assessment and Plan of Treatment: You came to the hospital because of back and leg pain that made it difficult for you to walk. an MRI showed normal brain, Disc desication is seen involving the L2-3 L3-4 L4-5 and L5-S1 levels secondary to chronic degenerative changes. L2-3: Disc bulge is seen which is more prominent on the left side which causes minimal effacement of the ventral thecal sac on the left side. Bilateral hypertrophic facet joint changes seen. Mild narrowing of the left spinal canal. Neurologist Dr. Brewer evaluated you and we sent further labs to workup for MS and neuromyelitis optica. Please follow up with Dr. Brewer as an outpatient for results and further management (details below)  You met with Pain Management, who helped control your pain. You will be discharged with the following regimen:  -Continue oxycodone 5 mg every 4 hours for severe pain. If you feel too drowsy or if there is concern for overdose use NARCAN, also prescribed to you  - Continue Flexeril 10mg PO TID x 3 days.    - Ibuprofen 400mg every 8 hours moderate pain.   - Continue Acetaminophen 1 gram every 6 hours for 2 days  - Continue Gabapentin 300mg every q 8 hours. Monitor renal function.   - Continue Lidoderm 4% patch daily.   Bowel Regimen  - Continue Miralax 17G PO daily  - Continue Senna 2 tablets at bedtime for constipation  - Dulcolax 5mg PO BID PRN constipation.  You may follow with Dr. Martin pain management [phone] or Telepain Dr. Luque at [phone] outpatient. It is important that you mobilize yourself, as well, as physical activity can help alleviate pain.  We are also discharging you with a prescription for outpatient physical therapy      SECONDARY DISCHARGE DIAGNOSES  Diagnosis: Obstructive sleep apnea  Assessment and Plan of Treatment: You may have sleep apnea, which means that there are periods during your sleep when you momentarily stop breathing. This can lead to complications including pulmonary hypertension. It is recommended that you follow with Dr. Rodriguez outpatient for an echocardiogram of your heart and for an outpatient sleep study so that any abnormalities can be found and treated.     PRINCIPAL DISCHARGE DIAGNOSIS  Diagnosis: Back pain  Assessment and Plan of Treatment: You came to the hospital because of back and leg pain that made it difficult for you to walk. an MRI showed normal brain, Disc desication is seen involving the L2-3 L3-4 L4-5 and L5-S1 levels secondary to chronic degenerative changes. L2-3: Disc bulge is seen which is more prominent on the left side which causes minimal effacement of the ventral thecal sac on the left side. Bilateral hypertrophic facet joint changes seen. Mild narrowing of the left spinal canal. Neurologist Dr. Brewer evaluated you and we sent further labs to workup for MS and neuromyelitis optica. Please follow up with Dr. Brewer as an outpatient for results and further management (details below)  You met with Pain Management, who helped control your pain. You will be discharged with the following regimen:  -Continue oxycodone 5 mg every 4 hours for severe pain. If you feel too drowsy or if there is concern for overdose use NARCAN, also prescribed to you  - Continue Flexeril 10mg PO TID x 3 days.    - Ibuprofen 400mg every 8 hours moderate pain.   - Continue Acetaminophen 1 gram every 6 hours for 2 days  - Continue Gabapentin 300mg every q 8 hours. Monitor renal function.   - Continue Lidoderm 4% patch daily.   Bowel Regimen  - Continue Miralax 17G PO daily  - Continue Senna 2 tablets at bedtime for constipation  - Dulcolax 5mg PO BID PRN constipation.  You may follow with Dr. Martin pain management [phone] or Telepain Dr. Luque at [phone] outpatient. It is important that you mobilize yourself, as well, as physical activity can help alleviate pain.  We are also discharging you with a prescription for outpatient physical therapy      SECONDARY DISCHARGE DIAGNOSES  Diagnosis: Obstructive sleep apnea  Assessment and Plan of Treatment: You may have sleep apnea, which means that there are periods during your sleep when you momentarily stop breathing. This can lead to complications including pulmonary hypertension. It is recommended that you follow with Dr. Rodriguez outpatient for an echocardiogram of your heart and for an outpatient sleep study so that any abnormalities can be found and treated.

## 2024-01-16 NOTE — DISCHARGE NOTE PROVIDER - NSDCHC_MEDRECSTATUS_GEN_ALL_CORE
Admission Reconciliation is Not Complete  Discharge Reconciliation is Not Complete pt does not have stairs to negotiate Admission Reconciliation is Completed  Discharge Reconciliation is Not Complete Admission Reconciliation is Completed  Discharge Reconciliation is Completed

## 2024-01-16 NOTE — DISCHARGE NOTE PROVIDER - NSDCMRMEDTOKEN_GEN_ALL_CORE_FT
cyclobenzaprine 10 mg oral tablet: 1 tab(s) orally 3 times a day   gabapentin 300 mg oral capsule: 1 cap(s) orally every 8 hours  ibuprofen 600 mg oral tablet: 1 tab(s) orally every 8 hours  ibuprofen 800 mg oral tablet: 1 tab(s) orally 3 times a day   Percocet 5 mg-325 mg oral tablet: 1 tab(s) orally every 6 hours MDD:4 tabs   cyclobenzaprine 10 mg oral tablet: 1 tab(s) orally 3 times a day  cyclobenzaprine 10 mg oral tablet: 1 tab(s) orally 3 times a day   gabapentin 300 mg oral capsule: 1 cap(s) orally every 8 hours  ibuprofen 600 mg oral tablet: 1 tab(s) orally every 8 hours  ibuprofen 800 mg oral tablet: 1 tab(s) orally 3 times a day   Percocet 5 mg-325 mg oral tablet: 1 tab(s) orally every 6 hours MDD:4 tabs   acetaminophen 500 mg oral tablet: 2 tab(s) orally every 6 hours  cyclobenzaprine 10 mg oral tablet: 1 tab(s) orally 3 times a day  gabapentin 300 mg oral capsule: 1 cap(s) orally every 8 hours   mg oral tablet: 1 tab(s) orally every 8 hours as needed for Moderate Pain (4 - 6)  Lidocare Pain Relief Patch 4% topical film: Apply topically to affected area once a day  Narcan 4 mg/0.1 mL nasal spray: 4 milligram(s) intranasally once a day PRN opioid withdrawal  oxyCODONE 5 mg oral tablet: 1 tab(s) orally every 6 hours as needed for Severe Pain (7 - 10) MDD: 4 tablets  Physical therapy 3x week: .  polyethylene glycol 3350 oral powder for reconstitution: 17 gram(s) orally once a day  senna leaf extract oral tablet: 2 tab(s) orally once a day (at bedtime)

## 2024-01-17 ENCOUNTER — TRANSCRIPTION ENCOUNTER (OUTPATIENT)
Age: 34
End: 2024-01-17

## 2024-01-17 VITALS
OXYGEN SATURATION: 97 % | DIASTOLIC BLOOD PRESSURE: 65 MMHG | RESPIRATION RATE: 18 BRPM | TEMPERATURE: 98 F | SYSTOLIC BLOOD PRESSURE: 120 MMHG | HEART RATE: 77 BPM

## 2024-01-17 DIAGNOSIS — M51.36 OTHER INTERVERTEBRAL DISC DEGENERATION, LUMBAR REGION: ICD-10-CM

## 2024-01-17 DIAGNOSIS — M41.9 SCOLIOSIS, UNSPECIFIED: ICD-10-CM

## 2024-01-17 DIAGNOSIS — M51.16 INTERVERTEBRAL DISC DISORDERS WITH RADICULOPATHY, LUMBAR REGION: ICD-10-CM

## 2024-01-17 LAB
A1C WITH ESTIMATED AVERAGE GLUCOSE RESULT: 5.4 % — SIGNIFICANT CHANGE UP (ref 4–5.6)
CHOLEST SERPL-MCNC: 149 MG/DL — SIGNIFICANT CHANGE UP
ESTIMATED AVERAGE GLUCOSE: 108 MG/DL — SIGNIFICANT CHANGE UP (ref 68–114)
HDLC SERPL-MCNC: 37 MG/DL — LOW
LIPID PNL WITH DIRECT LDL SERPL: 96 MG/DL — SIGNIFICANT CHANGE UP
NON HDL CHOLESTEROL: 112 MG/DL — SIGNIFICANT CHANGE UP
TRIGL SERPL-MCNC: 78 MG/DL — SIGNIFICANT CHANGE UP
TSH SERPL-MCNC: 2.24 UU/ML — SIGNIFICANT CHANGE UP (ref 0.34–4.82)

## 2024-01-17 PROCEDURE — 84702 CHORIONIC GONADOTROPIN TEST: CPT

## 2024-01-17 PROCEDURE — 72148 MRI LUMBAR SPINE W/O DYE: CPT

## 2024-01-17 PROCEDURE — 84443 ASSAY THYROID STIM HORMONE: CPT

## 2024-01-17 PROCEDURE — 97110 THERAPEUTIC EXERCISES: CPT

## 2024-01-17 PROCEDURE — 97162 PT EVAL MOD COMPLEX 30 MIN: CPT

## 2024-01-17 PROCEDURE — 80061 LIPID PANEL: CPT

## 2024-01-17 PROCEDURE — 36415 COLL VENOUS BLD VENIPUNCTURE: CPT

## 2024-01-17 PROCEDURE — 72157 MRI CHEST SPINE W/O & W/DYE: CPT

## 2024-01-17 PROCEDURE — 96366 THER/PROPH/DIAG IV INF ADDON: CPT

## 2024-01-17 PROCEDURE — 83036 HEMOGLOBIN GLYCOSYLATED A1C: CPT

## 2024-01-17 PROCEDURE — 72156 MRI NECK SPINE W/O & W/DYE: CPT

## 2024-01-17 PROCEDURE — 70553 MRI BRAIN STEM W/O & W/DYE: CPT

## 2024-01-17 PROCEDURE — 96375 TX/PRO/DX INJ NEW DRUG ADDON: CPT

## 2024-01-17 PROCEDURE — 72157 MRI CHEST SPINE W/O & W/DYE: CPT | Mod: 26

## 2024-01-17 PROCEDURE — 80048 BASIC METABOLIC PNL TOTAL CA: CPT

## 2024-01-17 PROCEDURE — 96361 HYDRATE IV INFUSION ADD-ON: CPT

## 2024-01-17 PROCEDURE — 99232 SBSQ HOSP IP/OBS MODERATE 35: CPT

## 2024-01-17 PROCEDURE — 83520 IMMUNOASSAY QUANT NOS NONAB: CPT

## 2024-01-17 PROCEDURE — 97530 THERAPEUTIC ACTIVITIES: CPT

## 2024-01-17 PROCEDURE — 72110 X-RAY EXAM L-2 SPINE 4/>VWS: CPT

## 2024-01-17 PROCEDURE — 99233 SBSQ HOSP IP/OBS HIGH 50: CPT

## 2024-01-17 PROCEDURE — 72148 MRI LUMBAR SPINE W/O DYE: CPT | Mod: 26

## 2024-01-17 PROCEDURE — 99285 EMERGENCY DEPT VISIT HI MDM: CPT

## 2024-01-17 PROCEDURE — 85027 COMPLETE CBC AUTOMATED: CPT

## 2024-01-17 PROCEDURE — 72156 MRI NECK SPINE W/O & W/DYE: CPT | Mod: 26

## 2024-01-17 PROCEDURE — 84181 WESTERN BLOT TEST: CPT

## 2024-01-17 PROCEDURE — 86053 AQAPRN-4 ANTB FLO CYTMTRY EA: CPT

## 2024-01-17 PROCEDURE — 97116 GAIT TRAINING THERAPY: CPT

## 2024-01-17 PROCEDURE — 70553 MRI BRAIN STEM W/O & W/DYE: CPT | Mod: 26

## 2024-01-17 PROCEDURE — 96365 THER/PROPH/DIAG IV INF INIT: CPT

## 2024-01-17 RX ORDER — IBUPROFEN 200 MG
400 TABLET ORAL EVERY 8 HOURS
Refills: 0 | Status: DISCONTINUED | OUTPATIENT
Start: 2024-01-17 | End: 2024-01-17

## 2024-01-17 RX ORDER — SENNA PLUS 8.6 MG/1
2 TABLET ORAL
Qty: 60 | Refills: 0
Start: 2024-01-17 | End: 2024-02-15

## 2024-01-17 RX ORDER — ACETAMINOPHEN 500 MG
2 TABLET ORAL
Qty: 16 | Refills: 0
Start: 2024-01-17 | End: 2024-01-18

## 2024-01-17 RX ORDER — NALOXONE HYDROCHLORIDE 4 MG/.1ML
4 SPRAY NASAL
Qty: 1 | Refills: 0
Start: 2024-01-17 | End: 2024-01-17

## 2024-01-17 RX ORDER — LIDOCAINE 4 G/100G
1 CREAM TOPICAL
Qty: 5 | Refills: 0
Start: 2024-01-17 | End: 2024-02-15

## 2024-01-17 RX ORDER — LIDOCAINE 4 G/100G
1 CREAM TOPICAL
Qty: 3 | Refills: 0
Start: 2024-01-17 | End: 2024-01-31

## 2024-01-17 RX ORDER — SENNA PLUS 8.6 MG/1
2 TABLET ORAL
Qty: 10 | Refills: 0
Start: 2024-01-17 | End: 2024-01-21

## 2024-01-17 RX ORDER — IBUPROFEN 200 MG
1 TABLET ORAL
Qty: 90 | Refills: 0
Start: 2024-01-17 | End: 2024-02-15

## 2024-01-17 RX ORDER — CYCLOBENZAPRINE HYDROCHLORIDE 10 MG/1
1 TABLET, FILM COATED ORAL
Qty: 0 | Refills: 0 | DISCHARGE
Start: 2024-01-17

## 2024-01-17 RX ORDER — POLYETHYLENE GLYCOL 3350 17 G/17G
17 POWDER, FOR SOLUTION ORAL
Qty: 510 | Refills: 0
Start: 2024-01-17 | End: 2024-02-15

## 2024-01-17 RX ORDER — POLYETHYLENE GLYCOL 3350 17 G/17G
17 POWDER, FOR SOLUTION ORAL
Qty: 1 | Refills: 0
Start: 2024-01-17 | End: 2024-02-15

## 2024-01-17 RX ORDER — OXYCODONE HYDROCHLORIDE 5 MG/1
1 TABLET ORAL
Qty: 16 | Refills: 0
Start: 2024-01-17 | End: 2024-01-20

## 2024-01-17 RX ORDER — POLYETHYLENE GLYCOL 3350 17 G/17G
17 POWDER, FOR SOLUTION ORAL
Qty: 85 | Refills: 0
Start: 2024-01-17 | End: 2024-01-21

## 2024-01-17 RX ADMIN — GABAPENTIN 300 MILLIGRAM(S): 400 CAPSULE ORAL at 13:19

## 2024-01-17 RX ADMIN — POLYETHYLENE GLYCOL 3350 17 GRAM(S): 17 POWDER, FOR SOLUTION ORAL at 10:51

## 2024-01-17 RX ADMIN — LIDOCAINE 1 PATCH: 4 CREAM TOPICAL at 07:19

## 2024-01-17 RX ADMIN — Medication 15 MILLIGRAM(S): at 05:38

## 2024-01-17 RX ADMIN — Medication 1000 MILLIGRAM(S): at 10:51

## 2024-01-17 RX ADMIN — Medication 1000 MILLIGRAM(S): at 05:39

## 2024-01-17 RX ADMIN — ENOXAPARIN SODIUM 40 MILLIGRAM(S): 100 INJECTION SUBCUTANEOUS at 17:54

## 2024-01-17 RX ADMIN — CYCLOBENZAPRINE HYDROCHLORIDE 10 MILLIGRAM(S): 10 TABLET, FILM COATED ORAL at 13:19

## 2024-01-17 RX ADMIN — PANTOPRAZOLE SODIUM 40 MILLIGRAM(S): 20 TABLET, DELAYED RELEASE ORAL at 05:40

## 2024-01-17 RX ADMIN — Medication 1000 MILLIGRAM(S): at 11:51

## 2024-01-17 RX ADMIN — Medication 1000 MILLIGRAM(S): at 17:32

## 2024-01-17 RX ADMIN — ENOXAPARIN SODIUM 40 MILLIGRAM(S): 100 INJECTION SUBCUTANEOUS at 05:39

## 2024-01-17 RX ADMIN — GABAPENTIN 300 MILLIGRAM(S): 400 CAPSULE ORAL at 05:39

## 2024-01-17 RX ADMIN — Medication 1000 MILLIGRAM(S): at 18:32

## 2024-01-17 RX ADMIN — CYCLOBENZAPRINE HYDROCHLORIDE 10 MILLIGRAM(S): 10 TABLET, FILM COATED ORAL at 05:39

## 2024-01-17 RX ADMIN — Medication 15 MILLIGRAM(S): at 07:01

## 2024-01-17 RX ADMIN — Medication 1000 MILLIGRAM(S): at 07:01

## 2024-01-17 RX ADMIN — ONDANSETRON 4 MILLIGRAM(S): 8 TABLET, FILM COATED ORAL at 06:12

## 2024-01-17 RX ADMIN — LIDOCAINE 1 PATCH: 4 CREAM TOPICAL at 10:51

## 2024-01-17 NOTE — DISCHARGE NOTE NURSING/CASE MANAGEMENT/SOCIAL WORK - NSDCPEFALRISK_GEN_ALL_CORE
For information on Fall & Injury Prevention, visit: [email or web address] OR  [email or web address] OR  https://www.cdc.gov/steadi/patient.html

## 2024-01-17 NOTE — PROGRESS NOTE ADULT - SUBJECTIVE AND OBJECTIVE BOX
Date of Service 01-17-24 @ 12:01    CHIEF COMPLAINT:Patient is a 33y old  Female who presents with a chief complaint of LBP .Pt appears comfortable.    	  REVIEW OF SYSTEMS:  CONSTITUTIONAL: No fever, weight loss, or fatigue  EYES: No eye pain, visual disturbances, or discharge  ENT:  No difficulty hearing, tinnitus, vertigo; No sinus or throat pain  NECK: No pain or stiffness  RESPIRATORY: No cough, wheezing, chills or hemoptysis; No Shortness of Breath  CARDIOVASCULAR: No chest pain, palpitations, passing out, dizziness, or leg swelling  GASTROINTESTINAL: No abdominal or epigastric pain. No nausea, vomiting, or hematemesis; No diarrhea or constipation. No melena or hematochezia.  GENITOURINARY: No dysuria, frequency, hematuria, or incontinence  NEUROLOGICAL: No headaches, memory loss, loss of strength, numbness, or tremors  SKIN: No itching, burning, rashes, or lesions   LYMPH Nodes: No enlarged glands  ENDOCRINE: No heat or cold intolerance; No hair loss  MUSCULOSKELETAL: No joint pain or swelling; No muscle, back, or extremity pain  PSYCHIATRIC: No depression, anxiety, mood swings, or difficulty sleeping  HEME/LYMPH: No easy bruising, or bleeding gums  ALLERGY AND IMMUNOLOGIC: No hives or eczema	        PHYSICAL EXAM:  T(C): 36.4 (01-17-24 @ 05:09), Max: 36.9 (01-16-24 @ 14:24)  HR: 73 (01-17-24 @ 05:09) (73 - 86)  BP: 105/68 (01-17-24 @ 05:09) (105/68 - 131/81)  RR: 18 (01-17-24 @ 05:09) (18 - 18)  SpO2: 98% (01-17-24 @ 05:09) (98% - 98%)  Wt(kg): --  I&O's Summary      Appearance: Normal	  HEENT:   Normal oral mucosa, PERRL, EOMI	  Lymphatic: No lymphadenopathy  Cardiovascular: Normal S1 S2, No JVD, No murmurs, No edema  Respiratory: Lungs clear to auscultation	  Psychiatry: A & O x 3, Mood & affect appropriate  Gastrointestinal:  Soft, Non-tender, + BS	  Skin: No rashes, No ecchymoses, No cyanosis	  Neurologic: Non-focal  Extremities: Normal range of motion, No clubbing, cyanosis or edema  Vascular: Peripheral pulses palpable 2+ bilaterally    MEDICATIONS  (STANDING):  acetaminophen     Tablet .. 1000 milliGRAM(s) Oral every 6 hours  cyclobenzaprine 10 milliGRAM(s) Oral three times a day  enoxaparin Injectable 40 milliGRAM(s) SubCutaneous every 12 hours  gabapentin 300 milliGRAM(s) Oral three times a day  lidocaine   4% Patch 1 Patch Transdermal daily  pantoprazole    Tablet 40 milliGRAM(s) Oral before breakfast  polyethylene glycol 3350 17 Gram(s) Oral daily  senna 2 Tablet(s) Oral at bedtime    	  	  LABS:	 	      Lipid Profile: Cholesterol 149  LDL --  HDL 37  TG 78  Ldl calc 96  Ratio --    HgA1c:   TSH: Thyroid Stimulating Hormone, Serum: 2.24 uU/mL (01-17 @ 05:50)      	  < from: MR Head w/wo IV Cont (01.17.24 @ 10:43) >  ACC: [id number] EXAM:  MR SPINE LUMBAR   ORDERED BY: DEVAN MEIER     ACC: [id number] EXAM:  MR SPINE THORACIC WAW IC   ORDERED BY: DEVAN MEIER     ACC: [id number] EXAM:  MR SPINE CERVICAL WAW IC   ORDERED BY: DEVAN MEIER     ACC: [id number] EXAM:  MR BRAIN WAW IC   ORDERED BY: DEVAN MEIER     PROCEDURE DATE:  01/17/2024          INTERPRETATION:  Clinical indication: Suspected demyelinating disease.    MRI of the brain was performed sagittal T1 axial T1 T2 T2 FLAIR diffusion   and susceptibility weighted sequence. Coronal T2-weighted sequence was   performed as well. MRI of the cervical thoracic and lumbar spine was   performed. Sagittal T1-T2 and STIR sequence was performed. Axial T1 and   T2-weighted sequences were performed as well as coronalT1-weighted   sequence was performed through the lumbar spine    Brain:    The lateral ventricles have a normal configuration    There is no acute hemorrhage mass or mass effect seen.    Asymmetry of lateral ventricles are seen. The left lateral ventricle is   more prominent than the right. This finding is likely of no clinical   significance    Evaluation of the diffusion weighted sequence demonstrates no abnormal   areas of restricted diffusion to suggest acute infarct.    The large vessels demonstrate normal.    Right maxillary polyp versus retention cyst is seen.    Both mastoid and middle ear regions appear clear.    Cervical spine:    Loss of the normal cervical lordosis is seen    Scoliosis is seen    The vertebral body height alignment and marrow signal.    The disc spaces appear preserved.    C2-3: Normal    C3-4: Normal    C4-5: Normal    C5-6: Normal    C6-7: Normal    C7-T1: Normal    The spinal cord demonstrate normal signal and caliber.    Evaluation of the paraspinal soft tissues appear normal.    Thoracic spine:    Scoliosis is seen    No abnormal disc herniations or significant central or neural foraminal   stenosis seen.    The spinal cord demonstrates normal signal and caliber.    Evaluation of the paraspinal soft tissues appear normal.    Lumbar spine:    Loss of the normal lumbar lordosis is seen.    The vertebral body height alignment and marrow signal appears normal    Disc desiccation is seen involving the L2-3 L3-4 L4-5 and L5-S1 levels   are secondary to chronic degenerative changes.    T12-L1: Normal    L1-2: Normal    L2-3: Disc bulge is seen which is more prominent on the left side which   causes minimal effacement of the ventral thecal sac on the left side.   Bilateral hypertrophic facet joint changes seen. Mild narrowing of the   left spinal canal.    L3-4: Bilateral hypertrophic facet joint changes are seen. No significant   compromise of the spinal canal or either neural foramen    L4-5: Bilateral hypertrophic facet joint changes are seen. No significant   compromise of the spinal canal or either neural foramen    Disc bulge and bilateral hypertrophic facet joint changes are seen. No   significant compromise of the spinal canal or either neural foramen    The conus ends at the bottom ofL1 and appears normal    Evaluation of the paraspinal soft tissues appear normal.    Both SI joints appear intact.    IMPRESSION: Unremarkable MRI of the brain.    Mild degenerative changes involving the lumbar spine.    Evaluation of the rest of thecervical thoracic and lumbar spine region   appears unremarkable            < end of copied text >

## 2024-01-17 NOTE — PROGRESS NOTE ADULT - PROBLEM SELECTOR PLAN 1
Pt with acute lumbar back pain, greatest over left lumbar back radiating to left hip, and upper thigh which is somatic and neuropathic in nature likely due to mild lumbar DDD, L2-3 disc bulge and scoliosis. Xray demonstrates DDD.  MRI cervical, thoracic and lumbar and MRI head demonstrates- Unremarkable MRI of the brain. Mild degenerative changes involving the lumbar spine. Evaluation of the rest of thecervical thoracic and lumbar spine region appears unremarkable  Opioid pain recommendations   - Continue oxycodone 5 mg PO q 4 hours PRN severe pain. Monitor for sedation/ respiratory depression.   Non-opioid pain recommendations   - Continue Flexeril 10mg PO TID x 3 days.   - Decrease Toradol 15mg IVP q 8 hours 24hrs with PPI.   - Continue Acetaminophen 1 gram PO q 6 hours x 4 days. Monitor LFTs  - Continue Gabapentin 300mg po q 8 hours. Monitor renal function.   - Continue Lidoderm 4% patch daily.   Bowel Regimen  - Continue Miralax 17G PO daily  - Continue Senna 2 tablets at bedtime for constipation  - Dulcolax 5mg PO BID PRN constipation Pt with acute lumbar back pain, greatest over left lumbar back radiating to left hip, and upper thigh which is somatic and neuropathic in nature likely due to mild lumbar DDD, L2-3 disc bulge and scoliosis. Xray demonstrates DDD.  MRI cervical, thoracic and lumbar and MRI head demonstrates- Unremarkable MRI of the brain. Mild degenerative changes involving the lumbar spine. Evaluation of the rest of thecervical thoracic and lumbar spine region appears unremarkable  Opioid pain recommendations   - Continue oxycodone 5 mg PO q 4 hours PRN severe pain. Monitor for sedation/ respiratory depression.   Non-opioid pain recommendations   - Continue Flexeril 10mg PO TID x 3 days.   - Toradol 15mg IVP q 8 hours 24hrs with PPI completed.  - Ibuprofen 400mg PO q8h PRN moderate pain.   - Continue Acetaminophen 1 gram PO q 6 hours x 4 days. Monitor LFTs  - Continue Gabapentin 300mg po q 8 hours. Monitor renal function.   - Continue Lidoderm 4% patch daily.   Bowel Regimen  - Continue Miralax 17G PO daily  - Continue Senna 2 tablets at bedtime for constipation  - Dulcolax 5mg PO BID PRN constipation

## 2024-01-17 NOTE — DISCHARGE NOTE NURSING/CASE MANAGEMENT/SOCIAL WORK - PATIENT PORTAL LINK FT
You can access the FollowMyHealth Patient Portal offered by [facility] by registering at the following website: [email or web address]. By joining [employer]’s FollowMyHealth portal, you will also be able to view your health information using other applications (apps) compatible with our system.

## 2024-01-17 NOTE — PROGRESS NOTE ADULT - ATTENDING COMMENTS
Pt appearing with debilitating LBP, clinically indicative of acute radiculopathy incapacitating pt in daily tasks /ambulation. Pain management initiated a regimen. May benefit from starting flexeril. Neurology to follow in a.m. May consider outpt epidural should this not improve in time.    1/16/24 - Reporting overall improvement since admission. Continues on Flexeril. Able to ambulated to limited extend now. Possible d/c planning pending improved pain tomorrow or Thursday. Will f/u MRI of brain given concern for a demyelinating lesional involvement.    1/17/24 - Pt reporting continued improvement of LBP. D/C planning for home pending MRI wnl.

## 2024-01-17 NOTE — PROGRESS NOTE ADULT - SUBJECTIVE AND OBJECTIVE BOX
PGY-1 Progress Note discussed with attending    PAGER #: [[phone]] TILL 5:00 PM  PLEASE CONTACT ON CALL TEAM:  - On Call Team (Please refer to Albino) FROM 5:00 PM - 8:30PM  - Nightfloat Team FROM 8:30 -7:30 AM    CHIEF COMPLAINT & BRIEF HOSPITAL COURSE:    INTERVAL HPI/OVERNIGHT EVENTS:   MEDICATIONS  (STANDING):  acetaminophen     Tablet .. 1000 milliGRAM(s) Oral every 6 hours  cyclobenzaprine 10 milliGRAM(s) Oral three times a day  enoxaparin Injectable 40 milliGRAM(s) SubCutaneous every 12 hours  gabapentin 300 milliGRAM(s) Oral three times a day  lidocaine   4% Patch 1 Patch Transdermal daily  pantoprazole    Tablet 40 milliGRAM(s) Oral before breakfast  polyethylene glycol 3350 17 Gram(s) Oral daily  senna 2 Tablet(s) Oral at bedtime    MEDICATIONS  (PRN):  bisacodyl 5 milliGRAM(s) Oral every 12 hours PRN Constipation  ondansetron Injectable 4 milliGRAM(s) IV Push every 8 hours PRN Nausea and/or Vomiting  oxyCODONE    IR 5 milliGRAM(s) Oral every 4 hours PRN Severe Pain (7 - 10)      REVIEW OF SYSTEMS:  CONSTITUTIONAL:  No fevers or chills, good appetite, good general state  EYES/ENT:  No visual changes;  No vertigo or throat pain   NECK:  No neck pain or stiffness  RESPIRATORY:  No cough, wheezing, hemoptysis; No shortness of breath  CARDIOVASCULAR:  No chest pain or palpitations  GASTROINTESTINAL:  No abdominal pain. No nausea, vomiting, or hematemesis; No diarrhea or constipation. No melena or hematochezia.  GENITOURINARY:  No dysuria, frequency or hematuria  NEUROLOGICAL:  No HA, no numbness or LE weakness  MSK: no back pain, no joint pain  SKIN:  No itching, no skin rash    Vital Signs Last 24 Hrs  T(C): 36.4 (17 Jan 2024 05:09), Max: 36.9 (16 Jan 2024 14:24)  T(F): 97.5 (17 Jan 2024 05:09), Max: 98.4 (16 Jan 2024 14:24)  HR: 73 (17 Jan 2024 05:09) (73 - 87)  BP: 105/68 (17 Jan 2024 05:09) (105/68 - 131/81)  BP(mean): 95 (16 Jan 2024 20:08) (95 - 95)  RR: 18 (17 Jan 2024 05:09) (18 - 18)  SpO2: 98% (17 Jan 2024 05:09) (98% - 99%)    Parameters below as of 17 Jan 2024 05:09  Patient On (Oxygen Delivery Method): room air        PHYSICAL EXAM:  VITALS: Vital Signs Last 24 Hrs  T(C): 36.4 (17 Jan 2024 05:09), Max: 36.9 (16 Jan 2024 14:24)  T(F): 97.5 (17 Jan 2024 05:09), Max: 98.4 (16 Jan 2024 14:24)  HR: 73 (17 Jan 2024 05:09) (73 - 87)  BP: 105/68 (17 Jan 2024 05:09) (105/68 - 131/81)  BP(mean): 95 (16 Jan 2024 20:08) (95 - 95)  RR: 18 (17 Jan 2024 05:09) (18 - 18)  SpO2: 98% (17 Jan 2024 05:09) (98% - 99%)    Parameters below as of 17 Jan 2024 05:09  Patient On (Oxygen Delivery Method): room air      Gen: AOx3, NAD, non-toxic, pleasant  HEAD:  Atraumatic, Normocephalic  EYES: PERRLA, conjunctiva and sclera clear  ENT: Moist mucous membranes  NECK: Supple, No JVD  CV: S1+S2 normal, no murmurs   Resp: Clear bilat, no resp distress, no crackles/wheezes  Abd: Soft, nontender, +BS  Ext: No LE edema, no cyanosis, LE pulses present  : Mcdonald (+/-)  IV/Skin: No thrombophlebitis, no skin rash  Msk: No arthralgias, no joint swelling  Neuro: AAOx3. No sensory deficits, no motor deficits                      CAPILLARY BLOOD GLUCOSE      RADIOLOGY & ADDITIONAL TESTS:

## 2024-01-17 NOTE — PROGRESS NOTE ADULT - PROBLEM SELECTOR PLAN 3
Mild pain (score 1-3)  - Non-pharmacological pain treatment recommendations  - Warm/ Cool packs PRN   - Repositioning extremity, elevation, imagery, relaxation, distraction.  - Physical therapy OOB if no contraindications   May follow up with Dr. Martin pain management [phone] or Telepain Dr. Luque at [phone] outpatient.   Recommendations discussed with primary team and RN.

## 2024-01-17 NOTE — PROGRESS NOTE ADULT - ASSESSMENT
Confidential Drug Utilization Report  Search Terms: Senait Roque, 1990Search Date: 01/15/2024 10:23:14 AM  The Drug Utilization Report below displays all of the controlled substance prescriptions, if any, that your patient has filled in the last twelve months. The information displayed on this report is compiled from pharmacy submissions to the Department, and accurately reflects the information as submitted by the pharmacies.    This report was requested by: Camille Bergeron | Reference #: [id number]    You have not added a ARMINDA number. Keeping your ARMINDA number(s) up to date on the My ARMINDA # tab will enable the separation of your prescriptions from others in the search results.    Practitioner Count: 0  Pharmacy Count: 0  Current Opioid Prescriptions: 0  Current Benzodiazepine Prescriptions: 0  Current Stimulant Prescriptions: 0      Patient Demographic Information (PDI)       PDI	First Name	Last Name	Birth Date	Gender	Street Address	[facility]	Zip Code  [place]	Senait Roque	1990	Female	[address] [facility]	[address]    Prescription Information      PDI Filter:    PDI	Current Rx	Drug Type	Rx Written	Rx Dispensed	Drug	Quantity	Days Supply	Prescriber Name	Prescriber ARMINDA #	Payment Method	Dispenser  A	N	B	05/10/2023	05/17/2023	alprazolam 0.5 mg tablet	60	30	Jozef Sutherland	[id number]	Medicaid	[facility]    * - Details of Drug Type : O = Opioid, B = Benzodiazepine, S = Stimulant    * - Drugs marked with an asterisk are compound drugs. If the compound drug is made up of more than one controlled substance, then each controlled substance will be a separate row in the table.

## 2024-01-17 NOTE — PROGRESS NOTE ADULT - PROBLEM SELECTOR PROBLEM 2
Lumbar disc herniation with radiculopathy
Prophylactic measure
DDD (degenerative disc disease), lumbar
Prophylactic measure
Prophylactic measure

## 2024-01-17 NOTE — PROGRESS NOTE ADULT - PROBLEM SELECTOR PLAN 2
lovenox, obesity adjusted dose  PPI
Mild pain (score 1-3)  - Non-pharmacological pain treatment recommendations  - Warm/ Cool packs PRN   - Repositioning extremity, elevation, imagery, relaxation, distraction.  - Physical therapy OOB if no contraindications   May follow up with Dr. Martin pain management [phone] or Telepain Dr. Luque at [phone] outpatient.   Recommendations discussed with primary team and RN.
lovenox, obesity adjusted dose  PPI
lovenox, obesity adjusted dose  PPI

## 2024-01-17 NOTE — PROGRESS NOTE ADULT - ASSESSMENT
33 y.o. F PMHx of morbid obesity presents to the ED today with lower back pain.  1.Echo done few mo ago as outpatient and was normal.  2.Sleep study as outpatient.  3.Back pain-pain management.  4.MRI as above.  5.GI and DVT prophylaxis.  6.Weight loss.

## 2024-01-17 NOTE — PROGRESS NOTE ADULT - SUBJECTIVE AND OBJECTIVE BOX
Source of information: COLLEEN GROSS, Chart review  Patient language: English  : n/a    HPI:  33 y.o. F PMHx of morbid obesity presents to the ED today with lower back pain.  Patient reports yesterday she suddenly had sharp pain and stiffness in her lower back rating down both legs.  Patient states that she has no comfortable position when she sitting or standing she has pain.  Denies any numbness, tingling, sensory losses, incontinence or inability to move lower extremities. States she did not take anything for the pain. Otherwise pt denies any chest pain, palpitations, shortness of breath, fever, chills, headache, visual changes, nausea, vomiting diarrhea. NKDA   (14 Jan 2024 19:08)    Pt is admitted for dorsalgia. LS xray demonstrates- Mild degenerative disc disease. MRI cervical, thoracic and lumbar and MRI head demonstrates- Unremarkable MRI of the brain. Mild degenerative changes involving the lumbar spine. Evaluation of the rest of thecervical thoracic and lumbar spine region appears unremarkableNeuro following. Pain consulted 1/15. Reports lumbar back pain started on 1/13. Denies hx heavy lifting, injury, falls. Denies hx back pain in the past. Reports pain progressively worsened on 1/13 for which she took Tylenol Motrin and Bengay at home with minimal relief. On 1/14 pt was having difficulty getting out of bed due to her back pain. Denies bladder/ bowel incontinence, saddle anesthesia. Reports occasional numbness/ tingling of b/l hands which occurs when laying on arm.   Pt seen and examined at bedside this afternoon. Pt laying in bed, reports lumbar back pain improved since admission. Rating back pain score 3-4/10 and tolerable while laying, reports pain increased to 10/10 while in MRI SCALE USED: (1-10 VNRS). Reports pain over right lumbar back and leg subsided. Experiencing pain over left lumbar back radiating to left buttock, and left thigh, occasionally to left abdomen upon ambulating. Pt describes pain as constant, aching, occasionally shooting, alleviated by pain medication, exacerbated by movement and ambulation and laying flat for prolonged time. Pt tolerating PO diet. Denies chest pain, SOB, nausea, vomiting. Also reports constipation, however had a BM after enema on 1/16. Patient stated goal for pain control: to be able to take deep breaths, get out of bed to chair and ambulate with tolerable pain control. Pt able to ambulate with PT. Plan for possible discharge home today.     PAST MEDICAL & SURGICAL HISTORY:      FAMILY HISTORY:      Social History:  Pt lives at home with family, ambulates independently, denies any tobacco, alcohol, drug use. (14 Jan 2024 19:08)   [ X] Denies ETOH use, illicit drug use and smoking    Allergies    cefazolin (Hives)    MEDICATIONS  (STANDING):  acetaminophen     Tablet .. 1000 milliGRAM(s) Oral every 6 hours  cyclobenzaprine 10 milliGRAM(s) Oral three times a day  enoxaparin Injectable 40 milliGRAM(s) SubCutaneous every 12 hours  gabapentin 300 milliGRAM(s) Oral three times a day  lidocaine   4% Patch 1 Patch Transdermal daily  pantoprazole    Tablet 40 milliGRAM(s) Oral before breakfast  polyethylene glycol 3350 17 Gram(s) Oral daily  senna 2 Tablet(s) Oral at bedtime    MEDICATIONS  (PRN):  bisacodyl 5 milliGRAM(s) Oral every 12 hours PRN Constipation  ibuprofen  Tablet. 400 milliGRAM(s) Oral every 8 hours PRN Moderate Pain (4 - 6)  ondansetron Injectable 4 milliGRAM(s) IV Push every 8 hours PRN Nausea and/or Vomiting  oxyCODONE    IR 5 milliGRAM(s) Oral every 4 hours PRN Severe Pain (7 - 10)      Vital Signs Last 24 Hrs  T(C): 36.4 (17 Jan 2024 05:09), Max: 36.9 (16 Jan 2024 14:24)  T(F): 97.5 (17 Jan 2024 05:09), Max: 98.4 (16 Jan 2024 14:24)  HR: 73 (17 Jan 2024 05:09) (73 - 86)  BP: 105/68 (17 Jan 2024 05:09) (105/68 - 131/81)  BP(mean): 95 (16 Jan 2024 20:08) (95 - 95)  RR: 18 (17 Jan 2024 05:09) (18 - 18)  SpO2: 98% (17 Jan 2024 05:09) (98% - 98%)    Parameters below as of 17 Jan 2024 05:09  Patient On (Oxygen Delivery Method): room air    LABS: Reviewed    Radiology: Reviewed.   < from: MR Head w/wo IV Cont (01.17.24 @ 10:43) >    ACC: [id number] EXAM:  MR SPINE LUMBAR   ORDERED BY: DEVAN MEIER     ACC: [id number] EXAM:  MR SPINE THORACIC WAW IC   ORDERED BY: DEVAN MEIER     ACC: [id number] EXAM:  MR SPINE CERVICAL WAW IC   ORDERED BY: DEVAN MEIER     ACC: [id number] EXAM:  MR BRAIN WAW IC   ORDERED BY: DEVAN MEIER     PROCEDURE DATE:  01/17/2024          INTERPRETATION:  Clinical indication: Suspected demyelinating disease.    MRI of the brain was performed sagittal T1 axial T1 T2 T2 FLAIR diffusion   and susceptibility weighted sequence. Coronal T2-weighted sequence was   performed as well. MRI of the cervical thoracic and lumbar spine was   performed. Sagittal T1-T2 and STIR sequence was performed. Axial T1 and   T2-weighted sequences were performed as well as coronalT1-weighted   sequence was performed through the lumbar spine    Brain:    The lateral ventricles have a normal configuration    There is no acute hemorrhage mass or mass effect seen.    Asymmetry of lateral ventricles are seen. The left lateral ventricle is   more prominent than the right. This finding is likely of no clinical   significance    Evaluation of the diffusion weighted sequence demonstrates no abnormal   areas of restricted diffusion to suggest acute infarct.    The large vessels demonstrate normal.    Right maxillary polyp versus retention cyst is seen.    Both mastoid and middle ear regions appear clear.    Cervical spine:    Loss of the normal cervical lordosis is seen    Scoliosis is seen    The vertebral body height alignment and marrow signal.    The disc spaces appear preserved.    C2-3: Normal    C3-4: Normal    C4-5: Normal    C5-6: Normal    C6-7: Normal    C7-T1: Normal    The spinal cord demonstrate normal signal and caliber.    Evaluation of the paraspinal soft tissues appear normal.    Thoracic spine:    Scoliosis is seen    No abnormal disc herniations or significant central or neural foraminal   stenosis seen.    The spinal cord demonstrates normal signal and caliber.    Evaluation of the paraspinal soft tissues appear normal.    Lumbar spine:    Loss of the normal lumbar lordosis is seen.    The vertebral body height alignment and marrow signal appears normal    Disc desiccation is seen involving the L2-3 L3-4 L4-5 and L5-S1 levels   are secondary to chronic degenerative changes.    T12-L1: Normal    L1-2: Normal    L2-3: Disc bulge is seen which is more prominent on the left side which   causes minimal effacement of the ventral thecal sac on the left side.   Bilateral hypertrophic facet joint changes seen. Mild narrowing of the   left spinal canal.    L3-4: Bilateral hypertrophic facet joint changes are seen. No significant   compromise of the spinal canal or either neural foramen    L4-5: Bilateral hypertrophic facet joint changes are seen. No significant   compromise of the spinal canal or either neural foramen    Disc bulge and bilateral hypertrophic facet joint changes are seen. No   significant compromise of the spinal canal or either neural foramen    The conus ends at the bottom ofL1 and appears normal    Evaluation of the paraspinal soft tissues appear normal.    Both SI joints appear intact.    IMPRESSION: Unremarkable MRI of the brain.    Mild degenerative changes involving the lumbar spine.    Evaluation of the rest of thecervical thoracic and lumbar spine region   appears unremarkable                    --- End of Report ---            JUAN JOSE GOINS MD; Attending Radiologist  This document has been electronically signed. Jan 17 2024 10:57AM    < end of copied text >    < from: Xray Lumbosacral Spine (01.14.24 @ 18:03) >    ACC: [id number] EXAM:  XR LS SPINE MIN 4 VIEWS   ORDERED BY: CHARISSE BE     PROCEDURE DATE:  01/14/2024          INTERPRETATION:  CLINICAL HISTORY: Pain.    Lumbosacral Spine.    Frontal, lateral and cone-down projections demonstrate satisfactory  alignment of the bony structures. There is a mild, rotatory,   thoracolumbar scoliosis convexity to the patient's left. No fracture or   vertebral compression can be appreciated. There is no evidence of pedicle   destruction. There is mild disc space narrowing at the L3-4, L4-5 and   L5-S1 levels. The sacroiliac joints are symmetric.    IMPRESSION: Mild degenerative disc disease. If clinically indicated,   further assessment with MRI is recommended.    --- End of Report ---            AMINTA LORENZANA MD; Attending Radiologist  This document has been electronically signed. Gonzalo 15 2024  9:57AM    < end of copied text >      ORT Score -   Family Hx of substance abuse	Female	      Male  Alcohol 	                                           1                     3  Illegal drugs	                                   2                     3  Rx drugs                                           4 	                  4  Personal Hx of substance abuse		  Alcohol 	                                          3	                  3  Illegal drugs                                     4	                  4  Rx drugs                                            5 	                  5  Age between 16- 45 years	           1                     1  hx preadolescent sexual abuse	   3 	                  0  Psychological disease		  ADD, OCD, bipolar, schizophrenia   2	          2  Depression                                           1 	          1  Total: 1    a score of 3 or lower indicates low risk for opioid abuse		  a score of 4-7 indicates moderate risk for opioid abuse		  a score of 8 or higher indicates high risk for opioid abuse    REVIEW OF SYSTEMS:  CONSTITUTIONAL: No fever or fatigue  HEENT:  No difficulty hearing, no change in vision  NECK: No pain or stiffness  RESPIRATORY: No cough, wheezing, chills or hemoptysis; No shortness of breath  CARDIOVASCULAR: No chest pain, palpitations, dizziness, or leg swelling  GASTROINTESTINAL: No loss of appetite, decreased PO intake. No abdominal or epigastric pain. No nausea, vomiting; No diarrhea + hx constipation.   GENITOURINARY: No dysuria, frequency, hematuria, retention or incontinence  MUSCULOSKELETAL: + lumbar back pain radiating to b/l legs; No joint swelling; no upper or lower motor strength weakness, no saddle anesthesia, bowel/bladder incontinence, no falls   NEURO: No headaches, + occasional numbness/tingling b/l hand (upon pressure), No weakness    PHYSICAL EXAM:  GENERAL:  Alert & Oriented X4, cooperative, NAD, Good concentration. Speech is clear.   RESPIRATORY: Respirations even and unlabored. Clear to auscultation bilaterally; No rales, rhonchi, wheezing, or rubs  CARDIOVASCULAR: Normal S1/S2, regular rate and rhythm; No murmurs, rubs, or gallops. No JVD.   GASTROINTESTINAL: + morbid obesity per BMI, Soft, Nontender, Nondistended; Bowel sounds present  PERIPHERAL VASCULAR:  Extremities warm without edema. 2+ Peripheral Pulses, No cyanosis, No calf tenderness  MUSCULOSKELETAL: Motor Strength 5/5 B/L upper and lower extremities; moves all extremities equally against gravity; ROM intact; +SLR left leg at 30 degrees, negative right SLR today at 45 degrees. + lumbar back tenderness on palpation left lumbar > right lumbar.   SKIN: Warm, dry, intact. No rashes, lesions, scars or wounds.     Risk factors associated with adverse outcomes related to opioid treatment  [ ]  Concurrent benzodiazepine use  [ ]  History/ Active substance use or alcohol use disorder  [ ] Psychiatric co-morbidity  [ ] Sleep apnea  [ ] COPD  [X ] BMI> 35  [ ] Liver dysfunction  [ ] Renal dysfunction  [ ] CHF  [ ] Smoker  [ ]  Age > 60 years    [X ]  [place]  Reviewed and Copied to Chart. See below.    Plan of care and goal oriented pain management treatment options were discussed with patient and /or primary care giver; all questions and concerns were addressed and care was aligned with patient's wishes.    Educated patient on goal oriented pain management treatment options     01-17-24 @ 12:06

## 2024-01-18 NOTE — PROGRESS NOTE ADULT - PROVIDER SPECIALTY LIST ADULT
Internal Medicine
Cardiology
Pain Medicine
Neurology
Internal Medicine
Internal Medicine
Pain Medicine

## 2024-01-18 NOTE — PROGRESS NOTE ADULT - SUBJECTIVE AND OBJECTIVE BOX
INTERVAL HPI/OVERNIGHT EVENTS:  Back pain and numbness constant worsened by movement. No bladder incontinence; Right eye diminished visual acuity  HEALTH ISSUES - PROBLEM Dx:  Lower back pain    Prophylactic measure    Dorsalgia    DDD (degenerative disc disease), lumbar    Gastritis    Morbid obesity    DDD (degenerative disc disease), lumbar    Scoliosis    Lumbar disc herniation with radiculopathy            MEDICATIONS  (STANDING):    MEDICATIONS  (PRN):      Allergies    cefazolin (Hives)    Intolerances        REVIEW OF SYSTEMS        Vital Signs Last 24 Hrs  T(C): 36.9 (17 Jan 2024 13:47), Max: 36.9 (17 Jan 2024 13:47)  T(F): 98.4 (17 Jan 2024 13:47), Max: 98.4 (17 Jan 2024 13:47)  HR: 77 (17 Jan 2024 13:47) (77 - 77)  BP: 120/65 (17 Jan 2024 13:47) (120/65 - 120/65)  BP(mean): --  RR: 18 (17 Jan 2024 13:47) (18 - 18)  SpO2: 97% (17 Jan 2024 13:47) (97% - 97%)    Parameters below as of 17 Jan 2024 13:47  Patient On (Oxygen Delivery Method): room air        PHYSICAL EXAM:    HEENT: PERRLA, EOMI,   Neck: Supple.  Respiratory: Breath sounds are clear bilaterally  Cardiovascular: S1 and S2, regular / irregular rhythm  Gastrointestinal: soft, nontender  Extremities:  no edema  Vascular: Caritid Bruit - no  Musculoskeletal: no joint swelling/tenderness, no abnormal movements  Skin: No rashes    Neurological exam:  HF: A x O x 3. Appropriately interactive, normal affect. Speech fluent, No Aphasia or paraphasic errors. Naming /repetition intact   CN: APD OD diminished color vision OD, VA 20/40 OD and 20/30 OS without correction, EOMI, VFF, facial sensation normal, no NLFD, tongue midline, Palate moves equally, SCM equal bilaterally  Motor: No pronator drift, Strength 5/5 in all 4 ext except hip adductors and abductors, normal bulk and tone, no tremor, rigidity or bradykinesia.    Sens: Diminished light touch, pin and temperature below T5 level bilaterally   Reflexes: Symmetric and normal . BJ 2+, BR 2+, KJ 2+, AJ 2+, downgoing toes b/l  Coord:  No FNFA, dysmetria, GENOVEVA intact   Gait/Balance: Cannot test        LABS:                RADIOLOGY & ADDITIONAL TESTS:  < from: MR Cervical Spine w/wo IV Cont (01.17.24 @ 10:43) >  ACC: [id number] EXAM:  MR SPINE LUMBAR   ORDERED BY: DEVAN MEIER     ACC: [id number] EXAM:  MR SPINE THORACIC WAW IC   ORDERED BY: DEVAN MEIER     ACC: [id number] EXAM:  MR SPINE CERVICAL WAW IC   ORDERED BY: DEVAN MEIER     ACC: [id number] EXAM:  MR BRAIN WAW IC   ORDERED BY: DEVAN MEIER     PROCEDURE DATE:  01/17/2024          INTERPRETATION:  Clinical indication: Suspected demyelinating disease.    MRI of the brain was performed sagittal T1 axial T1 T2 T2 FLAIR diffusion   and susceptibility weighted sequence. Coronal T2-weighted sequence was   performed as well. MRI of the cervical thoracic and lumbar spine was   performed. Sagittal T1-T2 and STIR sequence was performed. Axial T1 and   T2-weighted sequences were performed as well as coronalT1-weighted   sequence was performed through the lumbar spine    Brain:    The lateral ventricles have a normal configuration    There is no acute hemorrhage mass or mass effect seen.    Asymmetry of lateral ventricles are seen. The left lateral ventricle is   more prominent than the right. This finding is likely of no clinical   significance    Evaluation of the diffusion weighted sequence demonstrates no abnormal   areas of restricted diffusion to suggest acute infarct.    The large vessels demonstrate normal.    Right maxillary polyp versus retention cyst is seen.    Both mastoid and middle ear regions appear clear.    Cervical spine:    Loss of the normal cervical lordosis is seen    Scoliosis is seen    The vertebral body height alignment and marrow signal.    The disc spaces appear preserved.    C2-3: Normal    C3-4: Normal    C4-5: Normal    C5-6: Normal    C6-7: Normal    C7-T1: Normal    The spinal cord demonstrate normal signal and caliber.    Evaluation of the paraspinal soft tissues appear normal.    Thoracic spine:    Scoliosis is seen    No abnormal disc herniations or significant central or neural foraminal   stenosis seen.    The spinal cord demonstrates normal signal and caliber.    Evaluation of the paraspinal soft tissues appear normal.    Lumbar spine:    Loss of the normal lumbar lordosis is seen.    The vertebral body height alignment and marrow signal appears normal    Disc desiccation is seen involving the L2-3 L3-4 L4-5 and L5-S1 levels   are secondary to chronic degenerative changes.    T12-L1: Normal    L1-2: Normal    L2-3: Disc bulge is seen which is more prominent on the left side which   causes minimal effacement of the ventral thecal sac on the left side.   Bilateral hypertrophic facet joint changes seen. Mild narrowing of the   left spinal canal.    L3-4: Bilateral hypertrophic facet joint changes are seen. No significant   compromise of the spinal canal or either neural foramen    L4-5: Bilateral hypertrophic facet joint changes are seen. No significant   compromise of the spinal canal or either neural foramen    Disc bulge and bilateral hypertrophic facet joint changes are seen. No   significant compromise of the spinal canal or either neural foramen    The conus ends at the bottom ofL1 and appears normal    Evaluation of the paraspinal soft tissues appear normal.    Both SI joints appear intact.    IMPRESSION: Unremarkable MRI of the brain.    Mild degenerative changes involving the lumbar spine.    Evaluation of the rest of thecervical thoracic and lumbar spine region   appears unremarkable                    --- End of Report ---            JUAN JOSE GOINS MD; Attending Radiologist  This document has been electronically signed. Jan 17 2024 10:57AM    < end of copied text >

## 2024-01-18 NOTE — PROGRESS NOTE ADULT - ASSESSMENT
No apparent demyelination although the brain MRI does show a single T2 periventricular signal. Awaiting NMO antibody and MOG antibody tests. 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 Recommend PT and pain management weight loss for back pain relief. She has advanced lumbar disc dessication that will cause worsening back pain if she weight is not reduced. Discussed with patient

## 2024-01-21 LAB — AQP4 H2O CHANNEL AB SERPL IA-ACNC: NEGATIVE — SIGNIFICANT CHANGE UP

## 2024-01-26 ENCOUNTER — APPOINTMENT (OUTPATIENT)
Dept: PAIN MANAGEMENT | Facility: HOSPITAL | Age: 34
End: 2024-01-26
Payer: COMMERCIAL

## 2024-01-26 DIAGNOSIS — G89.4 CHRONIC PAIN SYNDROME: ICD-10-CM

## 2024-01-26 DIAGNOSIS — M79.2 NEURALGIA AND NEURITIS, UNSPECIFIED: ICD-10-CM

## 2024-01-26 DIAGNOSIS — M54.16 RADICULOPATHY, LUMBAR REGION: ICD-10-CM

## 2024-01-26 DIAGNOSIS — M47.817 SPONDYLOSIS W/OUT MYELOPATHY OR RADICULOPATHY, LUMBOSACRAL REGION: ICD-10-CM

## 2024-01-26 DIAGNOSIS — M79.18 MYALGIA, OTHER SITE: ICD-10-CM

## 2024-01-26 DIAGNOSIS — M48.062 SPINAL STENOSIS, LUMBAR REGION WITH NEUROGENIC CLAUDICATION: ICD-10-CM

## 2024-01-26 PROCEDURE — G2211 COMPLEX E/M VISIT ADD ON: CPT

## 2024-01-26 PROCEDURE — 99204 OFFICE O/P NEW MOD 45 MIN: CPT | Mod: 95

## 2024-01-26 RX ORDER — GABAPENTIN 300 MG/1
300 CAPSULE ORAL
Qty: 90 | Refills: 1 | Status: ACTIVE | COMMUNITY
Start: 2024-01-26 | End: 1900-01-01

## 2024-01-26 NOTE — PHYSICAL EXAM
[de-identified] :  Constitutional: Normal, well developed, no acute distress on audio/video examination Eyes: Symmetric, External structures on video examination ENT: Lips, mucosa and tongue normal on video examination Oropharynx: Lips normal, symmetric, no external lesions appreciated appreciated on video examination Respiratory: Non-labored breathing, no audible wheezes appreciated on audio/video examination Vascular: No cyanosis appreciated or edema appreciated on video examination GI:  no jaundice appreciated on video examination Neurovascular: CN grossly intact on video/audio examination, alert MSK: Normal muscle bulk on video examination

## 2024-01-26 NOTE — CONSULT LETTER
[Consult Letter:] : I had the pleasure of evaluating your patient, [unfilled]. [Dear  ___] : Dear  [unfilled], [Please see my note below.] : Please see my note below. [Consult Closing:] : Thank you very much for allowing me to participate in the care of this patient.  If you have any questions, please do not hesitate to contact me. [Sincerely,] : Sincerely, [FreeTextEntry3] :  Adriano Luque DO, MBA Director, [facility]  of Anesthesiology [facility] [employer] [facility]

## 2024-01-26 NOTE — REASON FOR VISIT
[FreeTextEntry2] : referred by SHIELA Infante [Initial Consultation] : an initial pain management consultation

## 2024-01-26 NOTE — HISTORY OF PRESENT ILLNESS
[Home] : at home, [unfilled] , at the time of the visit. [Medical Office: ([facility])___] : at the medical office located in  [Verbal consent obtained from patient] : the patient, [unfilled] [Back Pain] : back pain [FreeTextEntry1] : HPI  Ms. COLLEEN GROSS is a 33 year F otherwise healthy presents with bilateral back pain radiating to bilateral legs.  Pain is so bad that patient finds it difficult to perform adls and ambulate. Patient seen and evaluated in [facility] ED. denies any worsening numbness, weakness, bowel/bladder dysfunction.    Previous and current pain medications/doses/effects:  gabapentin morphine oxycodone   Previous Pain Treatments:  exercises  Previous Pain Injections:  na  Previous Diagnostic Studies/Images:  MRI CS, TS, LS 1/24  Asymmetry of lateral ventricles are seen. The left lateral ventricle is more prominent than the right. This finding is likely of no clinical significance  Evaluation of the diffusion weighted sequence demonstrates no abnormal areas of restricted diffusion to suggest acute infarct.  The large vessels demonstrate normal.  Right maxillary polyp versus retention cyst is seen.  Both mastoid and middle ear regions appear clear.  Cervical spine:  Loss of the normal cervical lordosis is seen  Scoliosis is seen  The vertebral body height alignment and marrow signal.  The disc spaces appear preserved.  C2-3: Normal  C3-4: Normal  C4-5: Normal  C5-6: Normal  C6-7: Normal  C7-T1: Normal  The spinal cord demonstrate normal signal and caliber.  Evaluation of the paraspinal soft tissues appear normal.  Thoracic spine:  Scoliosis is seen  No abnormal disc herniations or significant central or neural foraminal stenosis seen.  The spinal cord demonstrates normal signal and caliber.  Evaluation of the paraspinal soft tissues appear normal.  Lumbar spine:  Loss of the normal lumbar lordosis is seen.  The vertebral body height alignment and marrow signal appears normal  Disc desiccation is seen involving the L2-3 L3-4 L4-5 and L5-S1 levels are secondary to chronic degenerative changes.  T12-L1: Normal  L1-2: Normal  L2-3: Disc bulge is seen which is more prominent on the left side which causes minimal effacement of the ventral thecal sac on the left side. Bilateral hypertrophic facet joint changes seen. Mild narrowing of the left spinal canal.  L3-4: Bilateral hypertrophic facet joint changes are seen. No significant compromise of the spinal canal or either neural foramen  L4-5: Bilateral hypertrophic facet joint changes are seen. No significant compromise of the spinal canal or either neural foramen  Disc bulge and bilateral hypertrophic facet joint changes are seen. No significant compromise of the spinal canal or either neural foramen  The conus ends at the bottom of L1 and appears normal  Evaluation of the paraspinal soft tissues appear normal.  Both SI joints appear intact.  IMPRESSION: Unremarkable MRI of the brain.  Mild degenerative changes involving the lumbar spine.  Evaluation of the rest of the cervical thoracic and lumbar spine region appears unremarkable   [5] : an average pain level of 5/10 [Burning] : burning [9] : a minimum pain level of 9/10 [Stabbing] : stabbing [Sitting] : sitting [Standing] : standing [Medications] : medications

## 2024-01-26 NOTE — ASSESSMENT
[FreeTextEntry1] : >> Imaging and Other Studies   I personally reviewed the relevant imaging.  Discussed and explained to patient the likely source of pathology and pain.  Questions answered. MRI   >> Therapy and Other Modalities   exercises  >> Medications  lidocaine patch  trial gabapentin uptitrate to TID cautioned change in mood.  Encouraged to call with any worsening mood or depression/suicidal ideations   >> Interventions   Significant component of back and leg pain likely secondary to lumbar spinal stenosis demonstrated on MRI LS, refractory to 6 weeks of PT in the last 4 months, requiring ED visits, Will schedule L2-3 interlaminar epidural steroid injection r/b/a discussed   >> Consults   >> Discussion of Risks/Benefits/Alternatives    >Regarding any scheduled procedures:   In the event the patient is scheduled for a procedure,   I have discussed in detail with the patient that any interventional pain procedure is associated with potential risks.  The procedure may include an injection of steroids and potentially other medications (local anesthetic and normal saline) into the epidural space or surrounding tissue of the spine.  There are significant risks of this procedure which include and are not limited to infection, bleeding, worsening pain, dural puncture leading to postdural puncture headache, nerve damage, spinal cord injury, paralysis, stroke, and death.   There is a chance that the procedure does not improve their pain.   There are risks associated with the steroid being absorbed into the body systemically.  These include dysphoria, difficulty sleeping, mood swings and personality changes.  Premenopausal women may notice an irregularity in her menstrual cycle for 2-3 months following the injection.  Steroids can specifically affect patients with hypertension, diabetes, and peptic ulcers.  The procedure may cause a temporary increase in blood pressure and blood pressure, and may adversely affect a peptic ulcer.  Other, more rare complications, include avascular necrosis of joints, glaucoma and worsening of osteoporosis.   I have discussed the risks of the procedure at length with the patient, and the potential benefits of pain relief.  I have offered alternatives to the procedure.  All questions were answered.   The patient expressed understanding and wishes to proceed with the procedure.   > Longitudinal management of Complex Painful condition   The patient is being managed for a complex condition that requires ongoing management.  The nature of this condition demands nuanced approach to treatment.  The seriousness of the condition necessitates an in-depth and focused approach to management and coordination with other healthcare professionals.    This visit involves intricate evaluation and management of the patient's condition.  The complexity of the visit was due to the need for detailed assessment of the current state, consideration of potential complications and a careful balancing of treatment options to management the chronic condition effectively.   As detailed above, the patient has a chronic significant painful condition that requires regular and detailed management.  The condition's impact on the patient's quality of life and health is substantial and necessitates a comprehensive and tailored approach   >> Conclusion   There were no barriers to communication. Informed patient that I would be available for any additional questions. Patient was instructed to call with any worsening symptoms including severe pain, new numbness/weakness, or changes in the bowel/bladder function. Discussed role of nsaids in pain management and all relevant risks, if patient is continuing to require after 4 weeks the patient should f/u for alternative treatment. Instructed patient to maintain pain diary to monitor pain level, mobility, and function.   I explained to patient benefits and limitation of TeleMedicine visits  Patient understands that limitations include inability to perform comprehensive physical exam, which may lead to potential diagnostic inconsistencies.    Any scheduled procedures are based on history, imaging and limited physical exam performed on TeleHealth visit.  If necessary, additional focal physical exam will be performed on date of procedure  Patient understands that diagnosis and treatment may be limited by these inconsistencies and patient agrees to proceed with care plan

## 2024-01-31 LAB — MAG AB SER-ACNC: NEGATIVE — SIGNIFICANT CHANGE UP

## 2024-05-02 DIAGNOSIS — G47.33 OBSTRUCTIVE SLEEP APNEA (ADULT) (PEDIATRIC): ICD-10-CM

## 2024-05-02 DIAGNOSIS — Z09 ENCOUNTER FOR FOLLOW-UP EXAMINATION AFTER COMPLETED TREATMENT FOR CONDITIONS OTHER THAN MALIGNANT NEOPLASM: ICD-10-CM

## 2024-05-02 DIAGNOSIS — M54.50 LOW BACK PAIN, UNSPECIFIED: ICD-10-CM

## 2024-05-02 RX ORDER — NALOXONE HYDROCHLORIDE NASAL 4 MG/.1ML
4 SPRAY NASAL
Refills: 0 | Status: ACTIVE | COMMUNITY

## 2024-05-02 RX ORDER — GABAPENTIN 300 MG/1
300 CAPSULE ORAL
Refills: 0 | Status: ACTIVE | COMMUNITY

## 2024-05-02 RX ORDER — LIDOCAINE 40 MG/G
4 PATCH TOPICAL
Refills: 0 | Status: ACTIVE | COMMUNITY

## 2024-05-02 RX ORDER — CYCLOBENZAPRINE HYDROCHLORIDE 10 MG/1
10 TABLET, FILM COATED ORAL
Refills: 0 | Status: ACTIVE | COMMUNITY

## 2024-05-02 RX ORDER — POLYETHYLENE GLYCOL 3350 17 G/17G
17 POWDER, FOR SOLUTION ORAL
Refills: 0 | Status: ACTIVE | COMMUNITY

## 2024-05-02 RX ORDER — OXYCODONE 5 MG/1
5 TABLET ORAL
Refills: 0 | Status: ACTIVE | COMMUNITY

## 2024-05-03 ENCOUNTER — APPOINTMENT (OUTPATIENT)
Dept: NEUROLOGY | Facility: CLINIC | Age: 34
End: 2024-05-03

## 2024-09-17 ENCOUNTER — NON-APPOINTMENT (OUTPATIENT)
Age: 34
End: 2024-09-17

## 2025-07-23 NOTE — PHYSICAL THERAPY INITIAL EVALUATION ADULT - LEVEL OF INDEPENDENCE: STAND/SIT, REHAB EVAL
Echo 10/07/23: LVEF 53%, IVS 1.1cm, LVIW 1.3cm, ANA 24.4ml/m2  Echo 12/03/24: LVEF 67%, IVS 1.1cm, LVIW 1.3cm, ANA 40ml/m2  Coronary status: unknown  CHADS-VASC score = 8 (age3,female,HTN,CVA,DM,NYHA III)  Anticoagulation therapy: warfarin (10/2023-11/20/24 post attempted LAAO implant) Eliquis (started 11/23/24)  Antiarrhythmic medication: none  Procedure: attempted Amulet implant 09/27/24  Sleep Apnea: + THOMAS using CPAP    Referred by Dr. Egan. Diagnosed 10/20/23 after UTI. She AF with ischemic CVA. Could not afford DOAC. Treated with coumadin with labile INR. She was admitted again 01/24/24 due to low INR. History of GI bleed 12/2023. ALEX 4/30/2024 with 2.3 cm of LA appendage and prominent, large pectinate muscles Amulet was attempted to be implanted without success due to difficult anatomy.    Discussed with pt and daughter (Rosie) the findings and options of management. (Same medications vs Watchman vs epicardial clip)  Continue with metoprolol and eliquis  Follow up in 8 months  
October 2023. On coumadin  
Tolerating well except for a fall with hip fracture in December 2024    Creat 4/30/2025- 0.86  LFT 4/30/2025- normal  H/H 3/5/2025- normal    Continue with 5 mg PO BID  
contact guard